# Patient Record
Sex: MALE | Race: WHITE | NOT HISPANIC OR LATINO | Employment: OTHER | URBAN - NONMETROPOLITAN AREA
[De-identification: names, ages, dates, MRNs, and addresses within clinical notes are randomized per-mention and may not be internally consistent; named-entity substitution may affect disease eponyms.]

---

## 2021-05-23 ENCOUNTER — HOSPITAL ENCOUNTER (EMERGENCY)
Facility: HOSPITAL | Age: 61
Discharge: ANOTHER HOSPITAL | End: 2021-05-23
Attending: FAMILY MEDICINE | Admitting: FAMILY MEDICINE

## 2021-05-23 ENCOUNTER — APPOINTMENT (OUTPATIENT)
Dept: CT IMAGING | Facility: HOSPITAL | Age: 61
End: 2021-05-23

## 2021-05-23 ENCOUNTER — APPOINTMENT (OUTPATIENT)
Dept: GENERAL RADIOLOGY | Facility: HOSPITAL | Age: 61
End: 2021-05-23

## 2021-05-23 VITALS
HEART RATE: 96 BPM | DIASTOLIC BLOOD PRESSURE: 49 MMHG | WEIGHT: 140 LBS | RESPIRATION RATE: 14 BRPM | HEIGHT: 68 IN | TEMPERATURE: 98 F | BODY MASS INDEX: 21.22 KG/M2 | SYSTOLIC BLOOD PRESSURE: 140 MMHG | OXYGEN SATURATION: 97 %

## 2021-05-23 DIAGNOSIS — S32.82XA: ICD-10-CM

## 2021-05-23 DIAGNOSIS — S32.9XXA CLOSED DISPLACED FRACTURE OF PELVIS, UNSPECIFIED PART OF PELVIS, INITIAL ENCOUNTER (HCC): Primary | ICD-10-CM
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
## 2021-05-23 LAB
6-ACETYL MORPHINE: NEGATIVE
ABO GROUP BLD: NORMAL
ABO GROUP BLD: NORMAL
ALBUMIN SERPL-MCNC: 3.88 G/DL (ref 3.5–5.2)
ALBUMIN/GLOB SERPL: 1.5 G/DL
ALP SERPL-CCNC: 105 U/L (ref 39–117)
ALT SERPL W P-5'-P-CCNC: 24 U/L (ref 1–41)
AMPHET+METHAMPHET UR QL: POSITIVE
ANION GAP SERPL CALCULATED.3IONS-SCNC: 12.9 MMOL/L (ref 5–15)
AST SERPL-CCNC: 87 U/L (ref 1–40)
BARBITURATES UR QL SCN: NEGATIVE
BASOPHILS # BLD AUTO: 0.03 10*3/MM3 (ref 0–0.2)
BASOPHILS NFR BLD AUTO: 0.5 % (ref 0–1.5)
BENZODIAZ UR QL SCN: NEGATIVE
BILIRUB SERPL-MCNC: 1.4 MG/DL (ref 0–1.2)
BLD GP AB SCN SERPL QL: NEGATIVE
BUN SERPL-MCNC: 21 MG/DL (ref 8–23)
BUN/CREAT SERPL: 22.1 (ref 7–25)
BUPRENORPHINE SERPL-MCNC: NEGATIVE NG/ML
CALCIUM SPEC-SCNC: 8.7 MG/DL (ref 8.6–10.5)
CANNABINOIDS SERPL QL: POSITIVE
CHLORIDE SERPL-SCNC: 97 MMOL/L (ref 98–107)
CO2 SERPL-SCNC: 19.1 MMOL/L (ref 22–29)
COCAINE UR QL: NEGATIVE
CREAT SERPL-MCNC: 0.95 MG/DL (ref 0.76–1.27)
DEPRECATED RDW RBC AUTO: 43.1 FL (ref 37–54)
EOSINOPHIL # BLD AUTO: 0.01 10*3/MM3 (ref 0–0.4)
EOSINOPHIL NFR BLD AUTO: 0.2 % (ref 0.3–6.2)
ERYTHROCYTE [DISTWIDTH] IN BLOOD BY AUTOMATED COUNT: 13.2 % (ref 12.3–15.4)
ETHANOL BLD-MCNC: <10 MG/DL (ref 0–10)
ETHANOL UR QL: <0.01 %
GFR SERPL CREATININE-BSD FRML MDRD: 81 ML/MIN/1.73
GLOBULIN UR ELPH-MCNC: 2.5 GM/DL
GLUCOSE SERPL-MCNC: 172 MG/DL (ref 65–99)
HCT VFR BLD AUTO: 32.3 % (ref 37.5–51)
HGB BLD-MCNC: 11.3 G/DL (ref 13–17.7)
IMM GRANULOCYTES # BLD AUTO: 0.02 10*3/MM3 (ref 0–0.05)
IMM GRANULOCYTES NFR BLD AUTO: 0.3 % (ref 0–0.5)
LYMPHOCYTES # BLD AUTO: 1.34 10*3/MM3 (ref 0.7–3.1)
LYMPHOCYTES NFR BLD AUTO: 20.8 % (ref 19.6–45.3)
MCH RBC QN AUTO: 30.9 PG (ref 26.6–33)
MCHC RBC AUTO-ENTMCNC: 35 G/DL (ref 31.5–35.7)
MCV RBC AUTO: 88.3 FL (ref 79–97)
METHADONE UR QL SCN: NEGATIVE
MONOCYTES # BLD AUTO: 0.92 10*3/MM3 (ref 0.1–0.9)
MONOCYTES NFR BLD AUTO: 14.3 % (ref 5–12)
NEUTROPHILS NFR BLD AUTO: 4.11 10*3/MM3 (ref 1.7–7)
NEUTROPHILS NFR BLD AUTO: 63.9 % (ref 42.7–76)
NRBC BLD AUTO-RTO: 0 /100 WBC (ref 0–0.2)
OPIATES UR QL: NEGATIVE
OXYCODONE UR QL SCN: NEGATIVE
PCP UR QL SCN: NEGATIVE
PLATELET # BLD AUTO: 247 10*3/MM3 (ref 140–450)
PMV BLD AUTO: 10 FL (ref 6–12)
POTASSIUM SERPL-SCNC: 4.5 MMOL/L (ref 3.5–5.2)
PROT SERPL-MCNC: 6.4 G/DL (ref 6–8.5)
RBC # BLD AUTO: 3.66 10*6/MM3 (ref 4.14–5.8)
RH BLD: POSITIVE
RH BLD: POSITIVE
SODIUM SERPL-SCNC: 129 MMOL/L (ref 136–145)
T&S EXPIRATION DATE: NORMAL
WBC # BLD AUTO: 6.43 10*3/MM3 (ref 3.4–10.8)

## 2021-05-23 PROCEDURE — 86901 BLOOD TYPING SEROLOGIC RH(D): CPT | Performed by: FAMILY MEDICINE

## 2021-05-23 PROCEDURE — 99285 EMERGENCY DEPT VISIT HI MDM: CPT

## 2021-05-23 PROCEDURE — 74178 CT ABD&PLV WO CNTR FLWD CNTR: CPT | Performed by: RADIOLOGY

## 2021-05-23 PROCEDURE — 80307 DRUG TEST PRSMV CHEM ANLYZR: CPT | Performed by: FAMILY MEDICINE

## 2021-05-23 PROCEDURE — 72125 CT NECK SPINE W/O DYE: CPT

## 2021-05-23 PROCEDURE — 74178 CT ABD&PLV WO CNTR FLWD CNTR: CPT

## 2021-05-23 PROCEDURE — 70450 CT HEAD/BRAIN W/O DYE: CPT

## 2021-05-23 PROCEDURE — 86850 RBC ANTIBODY SCREEN: CPT | Performed by: FAMILY MEDICINE

## 2021-05-23 PROCEDURE — 25010000002 HYDROMORPHONE 1 MG/ML SOLUTION: Performed by: FAMILY MEDICINE

## 2021-05-23 PROCEDURE — 96375 TX/PRO/DX INJ NEW DRUG ADDON: CPT

## 2021-05-23 PROCEDURE — 86900 BLOOD TYPING SEROLOGIC ABO: CPT | Performed by: FAMILY MEDICINE

## 2021-05-23 PROCEDURE — 86901 BLOOD TYPING SEROLOGIC RH(D): CPT

## 2021-05-23 PROCEDURE — 86900 BLOOD TYPING SEROLOGIC ABO: CPT

## 2021-05-23 PROCEDURE — 71275 CT ANGIOGRAPHY CHEST: CPT

## 2021-05-23 PROCEDURE — 73502 X-RAY EXAM HIP UNI 2-3 VIEWS: CPT

## 2021-05-23 PROCEDURE — 25010000002 HYDROMORPHONE PER 4 MG: Performed by: FAMILY MEDICINE

## 2021-05-23 PROCEDURE — 85025 COMPLETE CBC W/AUTO DIFF WBC: CPT | Performed by: FAMILY MEDICINE

## 2021-05-23 PROCEDURE — 72131 CT LUMBAR SPINE W/O DYE: CPT

## 2021-05-23 PROCEDURE — 96376 TX/PRO/DX INJ SAME DRUG ADON: CPT

## 2021-05-23 PROCEDURE — 96374 THER/PROPH/DIAG INJ IV PUSH: CPT

## 2021-05-23 PROCEDURE — 82077 ASSAY SPEC XCP UR&BREATH IA: CPT | Performed by: FAMILY MEDICINE

## 2021-05-23 PROCEDURE — 80053 COMPREHEN METABOLIC PANEL: CPT | Performed by: FAMILY MEDICINE

## 2021-05-23 PROCEDURE — 0 IOPAMIDOL PER 1 ML: Performed by: FAMILY MEDICINE

## 2021-05-23 PROCEDURE — 72128 CT CHEST SPINE W/O DYE: CPT

## 2021-05-23 RX ORDER — HYDROMORPHONE HYDROCHLORIDE 1 MG/ML
0.5 INJECTION, SOLUTION INTRAMUSCULAR; INTRAVENOUS; SUBCUTANEOUS ONCE
Status: COMPLETED | OUTPATIENT
Start: 2021-05-23 | End: 2021-05-23

## 2021-05-23 RX ORDER — SODIUM CHLORIDE 0.9 % (FLUSH) 0.9 %
10 SYRINGE (ML) INJECTION AS NEEDED
Status: DISCONTINUED | OUTPATIENT
Start: 2021-05-23 | End: 2021-05-23 | Stop reason: HOSPADM

## 2021-05-23 RX ADMIN — HYDROMORPHONE HYDROCHLORIDE 1 MG: 1 INJECTION, SOLUTION INTRAMUSCULAR; INTRAVENOUS; SUBCUTANEOUS at 11:21

## 2021-05-23 RX ADMIN — IOPAMIDOL 70 ML: 755 INJECTION, SOLUTION INTRAVENOUS at 10:19

## 2021-05-23 RX ADMIN — HYDROMORPHONE HYDROCHLORIDE 0.5 MG: 1 INJECTION, SOLUTION INTRAMUSCULAR; INTRAVENOUS; SUBCUTANEOUS at 09:40

## 2021-05-23 RX ADMIN — SODIUM CHLORIDE 1000 ML: 9 INJECTION, SOLUTION INTRAVENOUS at 09:41

## 2021-05-23 NOTE — ED NOTES
C-collar applied at time of arrival per Dr. Dinorah FITZPATRICK.      Yolanda Juárez, RN  05/23/21 0992
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
## 2021-08-31 ENCOUNTER — OFFICE VISIT (OUTPATIENT)
Dept: SURGERY | Facility: CLINIC | Age: 61
End: 2021-08-31

## 2021-08-31 VITALS
HEIGHT: 68 IN | SYSTOLIC BLOOD PRESSURE: 138 MMHG | HEART RATE: 64 BPM | DIASTOLIC BLOOD PRESSURE: 84 MMHG | BODY MASS INDEX: 17.43 KG/M2 | WEIGHT: 115 LBS

## 2021-08-31 DIAGNOSIS — K40.90 NON-RECURRENT UNILATERAL INGUINAL HERNIA WITHOUT OBSTRUCTION OR GANGRENE: Primary | ICD-10-CM

## 2021-08-31 PROCEDURE — 99243 OFF/OP CNSLTJ NEW/EST LOW 30: CPT | Performed by: SURGERY

## 2021-08-31 RX ORDER — CYANOCOBALAMIN (VITAMIN B-12) 1000 MCG
TABLET, EXTENDED RELEASE ORAL
Status: ON HOLD | COMMUNITY
Start: 2021-08-03 | End: 2022-09-08

## 2021-08-31 RX ORDER — ASPIRIN 81 MG/1
TABLET ORAL
Status: ON HOLD | COMMUNITY
Start: 2021-08-03 | End: 2022-09-08

## 2021-08-31 RX ORDER — GABAPENTIN 800 MG/1
800 TABLET ORAL 2 TIMES DAILY
COMMUNITY

## 2021-08-31 RX ORDER — HYDROCODONE BITARTRATE AND ACETAMINOPHEN 5; 325 MG/1; MG/1
TABLET ORAL
Status: ON HOLD | COMMUNITY
Start: 2021-08-13 | End: 2022-09-08

## 2021-09-02 PROBLEM — K40.90 NON-RECURRENT UNILATERAL INGUINAL HERNIA WITHOUT OBSTRUCTION OR GANGRENE: Status: ACTIVE | Noted: 2021-09-02
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
## 2021-11-02 ENCOUNTER — HOSPITAL ENCOUNTER (OUTPATIENT)
Dept: HOSPITAL 79 - OR | Age: 61
Discharge: HOME | End: 2021-11-02
Attending: SURGERY
Payer: COMMERCIAL

## 2021-11-02 DIAGNOSIS — K40.90: Primary | ICD-10-CM

## 2021-11-02 DIAGNOSIS — F17.200: ICD-10-CM

## 2021-11-02 DIAGNOSIS — E53.8: ICD-10-CM

## 2021-11-02 DIAGNOSIS — Z20.822: ICD-10-CM

## 2021-11-02 PROCEDURE — C1781 MESH (IMPLANTABLE): HCPCS

## 2022-07-27 ENCOUNTER — TRANSCRIBE ORDERS (OUTPATIENT)
Dept: ADMINISTRATIVE | Facility: HOSPITAL | Age: 62
End: 2022-07-27

## 2022-07-27 DIAGNOSIS — F17.210 CIGARETTE SMOKER: Primary | ICD-10-CM

## 2022-09-08 ENCOUNTER — HOSPITAL ENCOUNTER (INPATIENT)
Facility: HOSPITAL | Age: 62
LOS: 1 days | Discharge: HOME | End: 2022-09-09
Attending: STUDENT IN AN ORGANIZED HEALTH CARE EDUCATION/TRAINING PROGRAM | Admitting: INTERNAL MEDICINE

## 2022-09-08 ENCOUNTER — APPOINTMENT (OUTPATIENT)
Dept: CT IMAGING | Facility: HOSPITAL | Age: 62
End: 2022-09-08

## 2022-09-08 DIAGNOSIS — I63.9 INFARCTION OF LEFT BASAL GANGLIA: Primary | ICD-10-CM

## 2022-09-08 LAB
ALBUMIN SERPL-MCNC: 4.4 G/DL (ref 3.5–5.2)
ALBUMIN/GLOB SERPL: 1.4 G/DL
ALP SERPL-CCNC: 114 U/L (ref 39–117)
ALT SERPL W P-5'-P-CCNC: 20 U/L (ref 1–41)
ANION GAP SERPL CALCULATED.3IONS-SCNC: 10.9 MMOL/L (ref 5–15)
AST SERPL-CCNC: 25 U/L (ref 1–40)
BASOPHILS # BLD AUTO: 0.11 10*3/MM3 (ref 0–0.2)
BASOPHILS NFR BLD AUTO: 1.4 % (ref 0–1.5)
BILIRUB SERPL-MCNC: 0.2 MG/DL (ref 0–1.2)
BILIRUB UR QL STRIP: NEGATIVE
BUN SERPL-MCNC: 15 MG/DL (ref 8–23)
BUN/CREAT SERPL: 18.8 (ref 7–25)
CALCIUM SPEC-SCNC: 10.3 MG/DL (ref 8.6–10.5)
CHLORIDE SERPL-SCNC: 100 MMOL/L (ref 98–107)
CLARITY UR: ABNORMAL
CO2 SERPL-SCNC: 25.1 MMOL/L (ref 22–29)
COLOR UR: YELLOW
CREAT SERPL-MCNC: 0.8 MG/DL (ref 0.76–1.27)
DEPRECATED RDW RBC AUTO: 46.4 FL (ref 37–54)
EGFRCR SERPLBLD CKD-EPI 2021: 100.7 ML/MIN/1.73
EOSINOPHIL # BLD AUTO: 0.24 10*3/MM3 (ref 0–0.4)
EOSINOPHIL NFR BLD AUTO: 3.1 % (ref 0.3–6.2)
ERYTHROCYTE [DISTWIDTH] IN BLOOD BY AUTOMATED COUNT: 13.7 % (ref 12.3–15.4)
FLUAV RNA RESP QL NAA+PROBE: NOT DETECTED
FLUBV RNA RESP QL NAA+PROBE: NOT DETECTED
GLOBULIN UR ELPH-MCNC: 3.2 GM/DL
GLUCOSE SERPL-MCNC: 92 MG/DL (ref 65–99)
GLUCOSE UR STRIP-MCNC: NEGATIVE MG/DL
HBA1C MFR BLD: 5.3 % (ref 4.8–5.6)
HCT VFR BLD AUTO: 44.9 % (ref 37.5–51)
HGB BLD-MCNC: 15.3 G/DL (ref 13–17.7)
HGB UR QL STRIP.AUTO: NEGATIVE
HOLD SPECIMEN: NORMAL
HOLD SPECIMEN: NORMAL
IMM GRANULOCYTES # BLD AUTO: 0.02 10*3/MM3 (ref 0–0.05)
IMM GRANULOCYTES NFR BLD AUTO: 0.3 % (ref 0–0.5)
KETONES UR QL STRIP: NEGATIVE
LEUKOCYTE ESTERASE UR QL STRIP.AUTO: NEGATIVE
LYMPHOCYTES # BLD AUTO: 2.53 10*3/MM3 (ref 0.7–3.1)
LYMPHOCYTES NFR BLD AUTO: 32.8 % (ref 19.6–45.3)
MCH RBC QN AUTO: 31.2 PG (ref 26.6–33)
MCHC RBC AUTO-ENTMCNC: 34.1 G/DL (ref 31.5–35.7)
MCV RBC AUTO: 91.4 FL (ref 79–97)
MONOCYTES # BLD AUTO: 0.53 10*3/MM3 (ref 0.1–0.9)
MONOCYTES NFR BLD AUTO: 6.9 % (ref 5–12)
NEUTROPHILS NFR BLD AUTO: 4.28 10*3/MM3 (ref 1.7–7)
NEUTROPHILS NFR BLD AUTO: 55.5 % (ref 42.7–76)
NITRITE UR QL STRIP: NEGATIVE
NRBC BLD AUTO-RTO: 0 /100 WBC (ref 0–0.2)
PH UR STRIP.AUTO: 6.5 [PH] (ref 5–8)
PLATELET # BLD AUTO: 276 10*3/MM3 (ref 140–450)
PMV BLD AUTO: 10.1 FL (ref 6–12)
POTASSIUM SERPL-SCNC: 4.3 MMOL/L (ref 3.5–5.2)
PROT SERPL-MCNC: 7.6 G/DL (ref 6–8.5)
PROT UR QL STRIP: NEGATIVE
RBC # BLD AUTO: 4.91 10*6/MM3 (ref 4.14–5.8)
SARS-COV-2 RNA RESP QL NAA+PROBE: NOT DETECTED
SODIUM SERPL-SCNC: 136 MMOL/L (ref 136–145)
SP GR UR STRIP: 1.01 (ref 1–1.03)
UROBILINOGEN UR QL STRIP: ABNORMAL
WBC NRBC COR # BLD: 7.71 10*3/MM3 (ref 3.4–10.8)
WHOLE BLOOD HOLD COAG: NORMAL
WHOLE BLOOD HOLD SPECIMEN: NORMAL

## 2022-09-08 PROCEDURE — 83036 HEMOGLOBIN GLYCOSYLATED A1C: CPT | Performed by: INTERNAL MEDICINE

## 2022-09-08 PROCEDURE — 93005 ELECTROCARDIOGRAM TRACING: CPT | Performed by: PHYSICIAN ASSISTANT

## 2022-09-08 PROCEDURE — 93010 ELECTROCARDIOGRAM REPORT: CPT | Performed by: INTERNAL MEDICINE

## 2022-09-08 PROCEDURE — 0 IOPAMIDOL PER 1 ML: Performed by: STUDENT IN AN ORGANIZED HEALTH CARE EDUCATION/TRAINING PROGRAM

## 2022-09-08 PROCEDURE — 70450 CT HEAD/BRAIN W/O DYE: CPT | Performed by: RADIOLOGY

## 2022-09-08 PROCEDURE — 99285 EMERGENCY DEPT VISIT HI MDM: CPT

## 2022-09-08 PROCEDURE — 70450 CT HEAD/BRAIN W/O DYE: CPT

## 2022-09-08 PROCEDURE — 99223 1ST HOSP IP/OBS HIGH 75: CPT | Performed by: INTERNAL MEDICINE

## 2022-09-08 PROCEDURE — 87636 SARSCOV2 & INF A&B AMP PRB: CPT | Performed by: PHYSICIAN ASSISTANT

## 2022-09-08 PROCEDURE — 81003 URINALYSIS AUTO W/O SCOPE: CPT | Performed by: PHYSICIAN ASSISTANT

## 2022-09-08 PROCEDURE — 80053 COMPREHEN METABOLIC PANEL: CPT | Performed by: PHYSICIAN ASSISTANT

## 2022-09-08 PROCEDURE — 70496 CT ANGIOGRAPHY HEAD: CPT

## 2022-09-08 PROCEDURE — 85025 COMPLETE CBC W/AUTO DIFF WBC: CPT | Performed by: PHYSICIAN ASSISTANT

## 2022-09-08 PROCEDURE — 70498 CT ANGIOGRAPHY NECK: CPT

## 2022-09-08 RX ORDER — CLOPIDOGREL BISULFATE 75 MG/1
75 TABLET ORAL DAILY
Status: DISCONTINUED | OUTPATIENT
Start: 2022-09-09 | End: 2022-09-09 | Stop reason: HOSPADM

## 2022-09-08 RX ORDER — ASPIRIN 81 MG/1
81 TABLET ORAL DAILY
Status: DISCONTINUED | OUTPATIENT
Start: 2022-09-09 | End: 2022-09-09 | Stop reason: HOSPADM

## 2022-09-08 RX ORDER — SODIUM CHLORIDE 0.9 % (FLUSH) 0.9 %
10 SYRINGE (ML) INJECTION EVERY 12 HOURS SCHEDULED
Status: DISCONTINUED | OUTPATIENT
Start: 2022-09-08 | End: 2022-09-09 | Stop reason: HOSPADM

## 2022-09-08 RX ORDER — ALBUTEROL SULFATE 90 UG/1
2 AEROSOL, METERED RESPIRATORY (INHALATION) EVERY 4 HOURS PRN
COMMUNITY

## 2022-09-08 RX ORDER — CLOPIDOGREL BISULFATE 75 MG/1
300 TABLET ORAL ONCE
Status: COMPLETED | OUTPATIENT
Start: 2022-09-08 | End: 2022-09-08

## 2022-09-08 RX ORDER — HYDROCODONE BITARTRATE AND ACETAMINOPHEN 7.5; 325 MG/1; MG/1
1 TABLET ORAL EVERY 8 HOURS PRN
COMMUNITY

## 2022-09-08 RX ORDER — SODIUM CHLORIDE 0.9 % (FLUSH) 0.9 %
10 SYRINGE (ML) INJECTION AS NEEDED
Status: DISCONTINUED | OUTPATIENT
Start: 2022-09-08 | End: 2022-09-09 | Stop reason: HOSPADM

## 2022-09-08 RX ORDER — GABAPENTIN 400 MG/1
800 CAPSULE ORAL 3 TIMES DAILY
Status: DISCONTINUED | OUTPATIENT
Start: 2022-09-08 | End: 2022-09-08

## 2022-09-08 RX ORDER — ATORVASTATIN CALCIUM 40 MG/1
80 TABLET, FILM COATED ORAL NIGHTLY
Status: DISCONTINUED | OUTPATIENT
Start: 2022-09-08 | End: 2022-09-09 | Stop reason: HOSPADM

## 2022-09-08 RX ORDER — HYDROCODONE BITARTRATE AND ACETAMINOPHEN 5; 325 MG/1; MG/1
1 TABLET ORAL ONCE
Status: COMPLETED | OUTPATIENT
Start: 2022-09-08 | End: 2022-09-08

## 2022-09-08 RX ORDER — LANOLIN ALCOHOL/MO/W.PET/CERES
1000 CREAM (GRAM) TOPICAL DAILY
COMMUNITY

## 2022-09-08 RX ORDER — GABAPENTIN 400 MG/1
800 CAPSULE ORAL ONCE
Status: COMPLETED | OUTPATIENT
Start: 2022-09-08 | End: 2022-09-08

## 2022-09-08 RX ORDER — ASPIRIN 81 MG/1
81 TABLET, CHEWABLE ORAL ONCE
Status: COMPLETED | OUTPATIENT
Start: 2022-09-08 | End: 2022-09-08

## 2022-09-08 RX ADMIN — IOPAMIDOL 90 ML: 755 INJECTION, SOLUTION INTRAVENOUS at 19:10

## 2022-09-08 RX ADMIN — GABAPENTIN 800 MG: 400 CAPSULE ORAL at 19:24

## 2022-09-08 RX ADMIN — CLOPIDOGREL 300 MG: 75 TABLET, FILM COATED ORAL at 18:07

## 2022-09-08 RX ADMIN — ASPIRIN 81 MG: 81 TABLET, CHEWABLE ORAL at 18:07

## 2022-09-08 RX ADMIN — Medication 10 ML: at 21:55

## 2022-09-08 RX ADMIN — ATORVASTATIN CALCIUM 80 MG: 40 TABLET, FILM COATED ORAL at 21:54

## 2022-09-08 RX ADMIN — HYDROCODONE BITARTRATE AND ACETAMINOPHEN 1 TABLET: 5; 325 TABLET ORAL at 19:20

## 2022-09-08 NOTE — CONSULTS
Teleneurology Consultation Note    Date of Consultation: 9/8/2022  Requesting Attending: Karthikeyan Ramesh DO  Location: ED or Floor   Date/Time Telestroke doctor on video: 9/8/2022 17:40 EDT  Chief Complaint/Reason for consultation: right sided weakness     History of Present Illness:   Fritz Andujar is a 61 y.o. man with a history of current tobacco use presenting with right sided weakness.     Mr. Andujar presents with his brother in law who helps provide some history.    Mr. Andujar states four days ago he had sudden onset right sided weakness after a fall while helping a friend with some work. He thought the weakness was due to the fall so he stayed at his friends home to get some rest. In the morning and up until today the weakness has persisted. He reports he also experienced some blurred vision and headache since that time.     He is currently on ASA but unsure of the indication. He is a current every day smoker 1 ppd. He denies any history of diabetes or hypertension.        Review of System: All 12 points of review of system has been obtained and pertinent positive mentioned in HPI.   Medical History: Pertinent past medical history, surgical history, family history and social history has been reviewed.   Allergies:   No Known Allergies    Physical Exam:     NIHSS 2 - dysarthria and right arm drift   Mr. Andujar is seen sitting up in bed   His brother-in-law is at bedside   Alert and oriented to exact date and year 2022   Comprehension, naming and repetition are intact  Mild-moderate dysarthria    Extraocular eye movements in tact without nystagmus   No visual field cut. Able to count fingers in both eyes   Face symmetric   Mild right pronator drift   No drift in bilateral lower extremities   Sensation intact to light touch bilaterally  Gait is strolling        Personal review of CNS imaging (Official report by radiology pending)  CT with subacute left basal ganglia infarct     Lab Results:  No results found for: HGBA1C,  LDL  No results found for: NILLTSLD92, TSH    Assessment and Plan:  ##Left basal ganglia ischemic stroke  ##Right sided weakness   #Concern for Acute Ischemic Stroke   Recommend for patients with minor ischemic stroke or high risk TIA, a short course of Dual Antiplatelet Therapy  - [ ] Aspirin 81mg indefinitely  - [ ] Plavix load of 300mg, then 75mg daily for 21 days (end date September 29th, 2022)  - [ ] Atorvastatin 80mg    - [ ] CT angiogram of the head and neck   - [ ] MRI brain with and without contrast   - [ ] Telemetry   - [ ] Echocardiogram with bubble   - [ ] Stroke labs - lipids, hgbA1c  - [ ] 28 day holter monitor at discharge   - [ ] SLP, PT and OT evaluation   - smoking cessation     We will see patient on rounds in AM       Irina Barragan MD  We will continue to follow   If you have any questions about the patient recommendations, please call [phone] at anytime and ask to speak with the neurologist on call.   Press 1 for an emergent consult and 2 for a non-emergent consult.        Teleneurology Patient Verification & Consent  I have proceeded with this evaluation at the direct request of the referring practitioner as there is felt to be no appropriate specialist available at bedside to perform these evaluations. The [facility] ([facility]) practitioner has reported to me this is the correct patient and has obtained verbal consent from the patient/surrogate to perform his voluntary telemedicine evaluation (including obtaining history, performing examination and reviewing data provided by the patient and/or originating [facility] care provider). I attest that I introduced myself to the patient, provided my credentials, disclosed my location, and determined that, based on review of the patient's chart and discussion with the patient's primary team, telemedicine via a HIPAA-compliant, real-time, face-to-face, two-way, interactive audio and video platform is an appropriate and  effective means of providing this service.  The patient/surrogate has the right to refuse this evaluation as they have been explained risks (including potential loss of confidentiality), benefits, alternatives, and the potential need for subsequent face to face care. Patient/surrogate understands that there is a risk of medical inaccuracies given that our recommendations will be made based on reported data (and we must therefore assume this information is accurate). Knowing that there is a risk that this information is not reported accurately, and that the telemedicine video, audio, or data feed may be incomplete, the patient agrees to proceed with evaluation and holds us harmless knowing these risks. In this evaluation, we will be providing recommendations only. The ultimate decision to follow, or not follow, these recommendations will be left to the bedside treating/requesting practitioner. The patient/surrogate has been notified that other healthcare professionals (including technical personnel) may be involved in this audio-video evaluation. All laws concerning confidentiality and patient access to medical records and copies of medical records apply to telemedicine. The patient/surrogate has received the originating site's Health Notice of Privacy Practices.    Medical Data Reviewed:   1. Data reviewed include clinical labs, radiology, medical test;  2. Obtaining/reviewing old medical records;  3. Obtaining case history from another source;  4. Independent review of CNS images    IIrina MD, saw patient on 9/8/2022 for an initial in-patient or emergency room telemedicine face-to-face consult using interactive technology.

## 2022-09-08 NOTE — H&P
[facility] HOSPITALIST HISTORY AND PHYSICAL    Patient Identification:  Name:  Fritz Andujar  Age:  61 y.o.  Sex:  male  :  1960  MRN:  [id number]   Admit Date: 2022   Visit Number:  [id number]  Primary Care Physician:  Val Krishna APRN       Chief complaint: Right sided weakness    History of presenting illness: Mr. Andujar is a 61 y.o. male who presented to [facility] Emergency Department on 2022 with a chief complaint of right-sided weakness.  Patient does not go to a physician routinely and as such does not know of any medical history that he has.  Patient states 3 days ago he was helping a friend move furniture into a home when he had sudden onset right-sided weakness with slurred speech.  Patient states he sat down for approximately 15 minutes and his symptoms did somewhat improve.  He went home where he stayed for the next 3 days until family members convinced him to come to the emergency department for further evaluation.  Patient states his slurred speech has slightly improved and his right upper extremity weakness has slightly improved but is still persistent.  Initial evaluation emergency department did consist of basic laboratory work as well as physical exam and vital signs.  Initial vital signs found patient's blood pressure 119/75, respirations 18, heart rate 98, temperature 98 °F and oxygen saturation 98% on room air.  Initial lab work did include CBC and CMP.  CBC was within normal limits.  CMP was within normal limits.  CT head without contrast demonstrated focal possibly late acute appearing left basal ganglionic infarct.  As such, code stroke was called and teleneurology services did evaluate the patient.  Recommendation was made to admit patient for further evaluation with transthoracic echocardiogram with bubble study, MRI brain, CTA of head and neck as well as initiation of aspirin, Plavix and atorvastatin.  Patient will be admitted for this  work-up.  -------------------------------------------------------------------------------------------------------------------  No past medical history on file.  No past surgical history on file.  No family history on file.  Social History     Socioeconomic History   • Marital status: Single   Tobacco Use   • Smoking status: Current Every Day Smoker     Packs/day: 1.00     Types: Cigarettes   • Smokeless tobacco: Never Used   Vaping Use   • Vaping Use: Never used   Substance and Sexual Activity   • Alcohol use: Yes     Alcohol/week: 3.0 standard drinks     Types: 3 Cans of beer per week     Comment: weekly   • Drug use: Never     ---------------------------------------------------------------------------------------------------------------------   Allergies:  Patient has no known allergies.  ---------------------------------------------------------------------------------------------------------------------   Prior to Admission Medications     Prescriptions Last Dose Informant Patient Reported? Taking?    Aspirin Adult Low Strength 81 MG EC tablet   Yes No    Cyanocobalamin (Vitamin B-12 ER) 1000 MCG tablet controlled-release   Yes No    gabapentin (NEURONTIN) 800 MG tablet   Yes No    Take 800 mg by mouth 3 (Three) Times a Day.    HYDROcodone-acetaminophen (NORCO) 5-325 MG per tablet   Yes No        Hospital Scheduled Meds:        ---------------------------------------------------------------------------------------------------------------------   Review of Systems   On review of systems the patient denies the following unless noted above:     Constitutional:  Fevers, chills, weight change, fatigue     Eyes: Vision changes, headache, double vision, loss of vision     ENT: Runny nose, nose bleeds, ringing in ears, pain with swallowing, sore throat     Cardiovascular: Chest pains, palpitations, PND, orthopnea     Respiratory: Cough, wheezing, SOA, hemoptysis     GI:  Abdominal pain, diarrhea, constipation, change in  stool caliber,    Rectal bleeding, vomiting or nausea     : Difficulty voiding, dysuria, hematuria     Musculoskeletal: Changes of any chronic joint pain, swelling     Skin: Rash, itching, easy bruisability     Neurological: Unilateral weakness, new onset numbness, speech difficulties     Psychiatric: Sadness, tearfulness, feelings of hopelessness, racing thoughts     Endocrine:  Heat or cold intolerance, mood swings, polydipsia, polyphagia,    recent hypoglycemia  --------------------------------------------------------------------------------------------------------------------  Vital Signs:  Temp:  [98 °F (36.7 °C)] 98 °F (36.7 °C)  Heart Rate:  [56-98] 62  Resp:  [16-18] 18  BP: (119-152)/(75-95) 147/80      09/08/22  1254   Weight: 62.6 kg (138 lb)     Body mass index is 20.38 kg/m².  ---------------------------------------------------------------------------------------------------------------------   Physical exam:  Constitutional: Thin appearing  male in no apparent distress.     HENT:  Head:  Normocephalic and atraumatic.  Mouth:  Moist mucous membranes.    Eyes:  Conjunctivae and EOM are normal.  Pupils are equal, round, and reactive to light.  No scleral icterus.    Neck:  Neck supple. No thyromegaly.  No JVD present.    Cardiovascular:  Regular rate and rhythm with no murmurs, rubs, clicks or gallops appreciated.  Pulmonary/Chest:  Clear to auscultation bilaterally with no crackles, wheezes or rhonchi appreciated.  Abdominal:  Soft. Nontender. Nondistended  Bowel sounds are normal in all four quadrants. No organomegally appreciated.   Musculoskeletal:  5/5 muscle strength left upper and lower extremity.  4+/5 muscle strength right upper and lower extremity. No edema, no tenderness, and no deformity.  No red or swollen joints anywhere.    Neurological:  Alert, Cranial nerves II-XII intact with no focal defecits.  No facial droop.  No slurred speech.   Patient with mild dysarthria  Skin:  Warm  and dry to palpation with no rashes or lesions appreciated.  Peripheral vascular:  2+ radial and pedal pulses in bilateral upper and lower extremities.  Psychiatric:  Alert and oriented x3, demonstrates appropriate judgement and insight.  -------------------------------------------------------------------------------  ---------------------------------------------------------------------------------------------------------------------   Results from last 7 days   Lab Units 09/08/22  1521   WBC 10*3/mm3 7.71   HEMOGLOBIN g/dL 15.3   HEMATOCRIT % 44.9   MCV fL 91.4   MCHC g/dL 34.1   PLATELETS 10*3/mm3 276         Results from last 7 days   Lab Units 09/08/22  1521   SODIUM mmol/L 136   POTASSIUM mmol/L 4.3   CHLORIDE mmol/L 100   CO2 mmol/L 25.1   BUN mg/dL 15   CREATININE mg/dL 0.80   CALCIUM mg/dL 10.3   GLUCOSE mg/dL 92   ALBUMIN g/dL 4.40   BILIRUBIN mg/dL 0.2   ALK PHOS U/L 114   AST (SGOT) U/L 25   ALT (SGPT) U/L 20   Estimated Creatinine Clearance: 85.9 mL/min (by C-G formula based on SCr of 0.8 mg/dL).  No results found for: AMMONIA          No results found for: HGBA1C  No results found for: TSH, FREET4  No results found for: PREGTESTUR, PREGSERUM, HCG, HCGQUANT  Pain Management Panel     Pain Management Panel Latest Ref Rng & Units 5/23/2021    AMPHETAMINES SCREEN, URINE Negative Positive(A)    BARBITURATES SCREEN Negative Negative    BENZODIAZEPINE SCREEN, URINE Negative Negative    BUPRENORPHINEUR Negative Negative    COCAINE SCREEN, URINE Negative Negative    METHADONE SCREEN, URINE Negative Negative        No results found for: BLOODCX  No results found for: URINECX  No results found for: WOUNDCX  No results found for: STOOLCX      ---------------------------------------------------------------------------------------------------------------------  Imaging Results (Last 7 Days)     Procedure Component Value Units Date/Time    CT Head Without Contrast [[id number]] Collected: 09/08/22 0876     Updated:  09/08/22 1425    Narrative:      EXAM:    CT Head Without Intravenous Contrast     EXAM DATE:    9/8/2022 1:21 PM     CLINICAL HISTORY:    right sided weakness     TECHNIQUE:    Axial computed tomography images of the head/brain without intravenous  contrast.  Sagittal and coronal reformatted images were created and  reviewed.  This CT exam was performed using one or more of the following  dose reduction techniques:  automated exposure control, adjustment of  the mA and/or kV according to patient size, and/or use of iterative  reconstruction technique.     COMPARISON:    05/23/2021     FINDINGS:    Brain:  Focal infarct of the left basal ganglia and appears to  localize to the patient came in. Given low-density appearance this may  represent late acute or early subacute etiology. Infarct is  approximately 12.4 mm.  No intracranial hemorrhage identified.  No  significant white matter disease.    Ventricles:  Unremarkable.  No ventriculomegaly.    Bones/joints:  Unremarkable.  No acute fracture.    Soft tissues:  Unremarkable.    Sinuses:  Unremarkable as visualized.  No acute sinusitis.    Mastoid air cells:  Unremarkable as visualized.  No mastoid effusion.       Impression:      1.  Focal, possibly late acute appearing left basal ganglionic infarct.  2.  No intracranial hemorrhage.     Findings communicated to the ER PASTEVE, at 2:22 PM on 09/08/2022.     This report was finalized on 9/8/2022 2:23 PM by Dr. Surinder Flores MD.             I have personally reviewed the radiology images and read the final radiology report.  ---------------------------------------------------------------------------------------------------------------------  Assessment and Plan:    1.  Late acute/subacute left ganglionic CVA -we will admit patient to telemetry unit and obtain transthoracic echocardiogram with bubble study, MRI brain, CT angiography head and neck and initiate aspirin, Plavix and statin therapy.  We will also  obtain hemoglobin A1c and fasting lipid panel in the morning.  Appreciate teleneurology recommendations.    2.  Tobacco abuse -patient reports smoking approximately 1 pack of cigarettes daily.  Patient was offered nicotine patch which she has politely refused.    Disposition likely discharge in the next 24 hours    Roberto Cruz,   09/08/22  18:24 EDT

## 2022-09-08 NOTE — ED PROVIDER NOTES
Subjective   PIT    This is a 61 year old male patient who presents to the ER with chief complaint of weakness. PMH significant for current smoker and chronic pain. He takes aspirin daily. 4 days ago, he started having weakness in the right side of his arm and leg as well as slurred speech. These symptoms have worsened since so he reports to ER for further evaluation. Denies chest pain, SOB.           Review of Systems   Constitutional: Negative for fever.   HENT: Negative.    Respiratory: Negative.    Cardiovascular: Negative.  Negative for chest pain.   Gastrointestinal: Negative.  Negative for abdominal pain.   Endocrine: Negative.    Genitourinary: Negative.  Negative for dysuria.   Skin: Negative.    Neurological: Positive for speech difficulty and weakness. Negative for dizziness, tremors, seizures, syncope, facial asymmetry, light-headedness, numbness and headaches.   Psychiatric/Behavioral: Negative.    All other systems reviewed and are negative.      No past medical history on file.    No Known Allergies    No past surgical history on file.    No family history on file.    Social History     Socioeconomic History   • Marital status: Single   Tobacco Use   • Smoking status: Current Every Day Smoker     Packs/day: 1.00     Types: Cigarettes   • Smokeless tobacco: Never Used   Vaping Use   • Vaping Use: Never used   Substance and Sexual Activity   • Alcohol use: Yes     Alcohol/week: 3.0 standard drinks     Types: 3 Cans of beer per week     Comment: weekly   • Drug use: Never           Objective   Physical Exam  Vitals and nursing note reviewed.   Constitutional:       General: He is not in acute distress.     Appearance: He is well-developed. He is not diaphoretic.   HENT:      Head: Normocephalic and atraumatic.      Right Ear: External ear normal.      Left Ear: External ear normal.      Nose: Nose normal.   Eyes:      Conjunctiva/sclera: Conjunctivae normal.      Pupils: Pupils are equal, round, and  reactive to light.   Neck:      Vascular: No JVD.      Trachea: No tracheal deviation.   Cardiovascular:      Rate and Rhythm: Normal rate and regular rhythm.      Heart sounds: Normal heart sounds. No murmur heard.  Pulmonary:      Effort: Pulmonary effort is normal. No respiratory distress.      Breath sounds: Normal breath sounds. No wheezing.   Abdominal:      General: Bowel sounds are normal.      Palpations: Abdomen is soft.      Tenderness: There is no abdominal tenderness.   Musculoskeletal:         General: No deformity. Normal range of motion.      Cervical back: Normal range of motion and neck supple.   Skin:     General: Skin is warm and dry.      Coloration: Skin is not pale.      Findings: No erythema or rash.   Neurological:      Mental Status: He is alert and oriented to person, place, and time.      Cranial Nerves: No cranial nerve deficit.      Motor: Weakness present.      Comments: Facial asymmetry and Slurred speech noted. Normal EOMs and pupillary reflexes bilaterally. 3/5 plantar flexion and  strength on the right side. 5/5  and plantar flexion strength on the left side.    Psychiatric:         Behavior: Behavior normal.         Thought Content: Thought content normal.         Procedures           ED Course  ED Course as of 09/08/22 1759   Thu Sep 08, 2022   1437 CT Head Without Contrast  IMPRESSION:  1.  Focal, possibly late acute appearing left basal ganglionic infarct.  2.  No intracranial hemorrhage.     Findings communicated to the ER STEVE BRINK, at 2:22 PM on 09/08/2022.     This report was finalized on 9/8/2022 2:23 PM by Dr. Surinder Flores MD. [MM]   1442 NIH stroke scale is 3 [MM]   1452 Sinus bradycardia with heart rate 58, , QRS 84   Specific ST repolarization abnormalities in V2, V3, V4 [LK]   1728 Spoke with Dr. Barragan neuro at [facility], who has said to admit the patient to our hospitalist and asked me to do 300 mg plavix today and 75 mg plavix daily after  that as well as his daily aspirin 81 mg. She has also asked me to get a CTA of head and neck now and an MRI in the next 24 hours as well as an echo.  [MM]   3397 Spoke with Dr. Cruz who has accepted him in admission.  [MM]      ED Course User Index  [LK] Jessi Borjas DO  [MM] Mercy Flores PA Dr King: Only documented EKG on this pt encounter. I had no involvement in the medical management of this patient.                                          MDM  Number of Diagnoses or Management Options     Amount and/or Complexity of Data Reviewed  Clinical lab tests: ordered and reviewed  Tests in the radiology section of CPT®: ordered and reviewed  Discuss the patient with other providers: yes        Final diagnoses:   Infarction of left basal ganglia (HCC)       ED Disposition  ED Disposition     ED Disposition   Decision to Admit    Condition   --    Comment   --             No follow-up provider specified.       Medication List      No changes were made to your prescriptions during this visit.          Mercy Flores PA  09/08/22 1972       Jessi Borjas DO  10/14/22 2234

## 2022-09-08 NOTE — ED NOTES
MEDICAL SCREENING:    Reason for Visit: Right-sided weakness with speech difficulty x4 days.  No change today.  Reviewed with Dr. Ramesh.  No code stroke alerted since he is 4 days out.    Patient initially seen in triage.  The patient was advised further evaluation and diagnostic testing will be needed, some of the treatment and testing will be initiated in the lobby in order to begin the process.  The patient will be returned to the waiting area for the time being and possibly be re-assessed by a subsequent ED provider.  The patient will be brought back to the treatment area in as timely manner as possible.         Aditya Newell PA  09/08/22 4936

## 2022-09-09 ENCOUNTER — APPOINTMENT (OUTPATIENT)
Dept: CARDIOLOGY | Facility: HOSPITAL | Age: 62
End: 2022-09-09

## 2022-09-09 ENCOUNTER — APPOINTMENT (OUTPATIENT)
Dept: MRI IMAGING | Facility: HOSPITAL | Age: 62
End: 2022-09-09

## 2022-09-09 ENCOUNTER — APPOINTMENT (OUTPATIENT)
Dept: RESPIRATORY THERAPY | Facility: HOSPITAL | Age: 62
End: 2022-09-09

## 2022-09-09 VITALS
TEMPERATURE: 97.8 F | DIASTOLIC BLOOD PRESSURE: 78 MMHG | RESPIRATION RATE: 18 BRPM | HEIGHT: 68 IN | BODY MASS INDEX: 17.23 KG/M2 | HEART RATE: 80 BPM | OXYGEN SATURATION: 98 % | SYSTOLIC BLOOD PRESSURE: 128 MMHG | WEIGHT: 113.7 LBS

## 2022-09-09 PROBLEM — I63.9 INFARCTION OF LEFT BASAL GANGLIA (HCC): Status: RESOLVED | Noted: 2022-09-08 | Resolved: 2022-09-09

## 2022-09-09 PROBLEM — E43 SEVERE MALNUTRITION: Status: ACTIVE | Noted: 2022-09-09

## 2022-09-09 LAB
BH CV ECHO MEAS - AO MAX PG: 4.8 MMHG
BH CV ECHO MEAS - AO MEAN PG: 3 MMHG
BH CV ECHO MEAS - AO ROOT DIAM: 2.7 CM
BH CV ECHO MEAS - AO V2 MAX: 110 CM/SEC
BH CV ECHO MEAS - AO V2 VTI: 26.2 CM
BH CV ECHO MEAS - EDV(CUBED): 70.4 ML
BH CV ECHO MEAS - EDV(MOD-SP4): 40.3 ML
BH CV ECHO MEAS - EF(MOD-SP4): 56.1 %
BH CV ECHO MEAS - ESV(CUBED): 33.1 ML
BH CV ECHO MEAS - ESV(MOD-SP4): 17.7 ML
BH CV ECHO MEAS - FS: 22.3 %
BH CV ECHO MEAS - IVS/LVPW: 0.93 CM
BH CV ECHO MEAS - IVSD: 1.06 CM
BH CV ECHO MEAS - LAT PEAK E' VEL: 13.1 CM/SEC
BH CV ECHO MEAS - LV DIASTOLIC VOL/BSA (35-75): 25.1 CM2
BH CV ECHO MEAS - LV MASS(C)D: 153 GRAMS
BH CV ECHO MEAS - LV SYSTOLIC VOL/BSA (12-30): 11 CM2
BH CV ECHO MEAS - LVIDD: 4.1 CM
BH CV ECHO MEAS - LVIDS: 3.2 CM
BH CV ECHO MEAS - LVOT AREA: 2.8 CM2
BH CV ECHO MEAS - LVOT DIAM: 1.9 CM
BH CV ECHO MEAS - LVPWD: 1.14 CM
BH CV ECHO MEAS - MED PEAK E' VEL: 5.9 CM/SEC
BH CV ECHO MEAS - MV A MAX VEL: 77.7 CM/SEC
BH CV ECHO MEAS - MV E MAX VEL: 69.5 CM/SEC
BH CV ECHO MEAS - MV E/A: 0.89
BH CV ECHO MEAS - PA ACC TIME: 0.12 SEC
BH CV ECHO MEAS - PA PR(ACCEL): 25 MMHG
BH CV ECHO MEAS - SI(MOD-SP4): 14.1 ML/M2
BH CV ECHO MEAS - SV(MOD-SP4): 22.6 ML
BH CV ECHO MEAS - TAPSE (>1.6): 3.1 CM
BH CV ECHO MEASUREMENTS AVERAGE E/E' RATIO: 7.32
CHOLEST SERPL-MCNC: 181 MG/DL (ref 0–200)
HDLC SERPL-MCNC: 44 MG/DL (ref 40–60)
LDLC SERPL CALC-MCNC: 90 MG/DL (ref 0–100)
LDLC/HDLC SERPL: 1.83 {RATIO}
LEFT ATRIUM VOLUME INDEX: 11.6 ML/M2
MAXIMAL PREDICTED HEART RATE: 159 BPM
STRESS TARGET HR: 135 BPM
TRIGL SERPL-MCNC: 282 MG/DL (ref 0–150)
TROPONIN T SERPL-MCNC: <0.01 NG/ML (ref 0–0.03)
VLDLC SERPL-MCNC: 47 MG/DL (ref 5–40)

## 2022-09-09 PROCEDURE — 93010 ELECTROCARDIOGRAM REPORT: CPT | Performed by: INTERNAL MEDICINE

## 2022-09-09 PROCEDURE — 92523 SPEECH SOUND LANG COMPREHEN: CPT

## 2022-09-09 PROCEDURE — 0 GADOBENATE DIMEGLUMINE 529 MG/ML SOLUTION: Performed by: INTERNAL MEDICINE

## 2022-09-09 PROCEDURE — 99239 HOSP IP/OBS DSCHRG MGMT >30: CPT | Performed by: INTERNAL MEDICINE

## 2022-09-09 PROCEDURE — 80061 LIPID PANEL: CPT | Performed by: INTERNAL MEDICINE

## 2022-09-09 PROCEDURE — 99232 SBSQ HOSP IP/OBS MODERATE 35: CPT | Performed by: NURSE PRACTITIONER

## 2022-09-09 PROCEDURE — 70553 MRI BRAIN STEM W/O & W/DYE: CPT

## 2022-09-09 PROCEDURE — 70553 MRI BRAIN STEM W/O & W/DYE: CPT | Performed by: RADIOLOGY

## 2022-09-09 PROCEDURE — 92610 EVALUATE SWALLOWING FUNCTION: CPT

## 2022-09-09 PROCEDURE — 94799 UNLISTED PULMONARY SVC/PX: CPT

## 2022-09-09 PROCEDURE — 93306 TTE W/DOPPLER COMPLETE: CPT | Performed by: INTERNAL MEDICINE

## 2022-09-09 PROCEDURE — A9577 INJ MULTIHANCE: HCPCS | Performed by: INTERNAL MEDICINE

## 2022-09-09 PROCEDURE — 93306 TTE W/DOPPLER COMPLETE: CPT

## 2022-09-09 PROCEDURE — 84484 ASSAY OF TROPONIN QUANT: CPT | Performed by: INTERNAL MEDICINE

## 2022-09-09 PROCEDURE — 93005 ELECTROCARDIOGRAM TRACING: CPT | Performed by: INTERNAL MEDICINE

## 2022-09-09 PROCEDURE — 97161 PT EVAL LOW COMPLEX 20 MIN: CPT

## 2022-09-09 RX ORDER — ATORVASTATIN CALCIUM 80 MG/1
80 TABLET, FILM COATED ORAL NIGHTLY
Qty: 90 TABLET | Refills: 1 | Status: SHIPPED | OUTPATIENT
Start: 2022-09-09

## 2022-09-09 RX ORDER — GABAPENTIN 400 MG/1
800 CAPSULE ORAL EVERY 8 HOURS SCHEDULED
Status: CANCELLED | OUTPATIENT
Start: 2022-09-09

## 2022-09-09 RX ORDER — GABAPENTIN 400 MG/1
800 CAPSULE ORAL EVERY 12 HOURS SCHEDULED
Status: DISCONTINUED | OUTPATIENT
Start: 2022-09-09 | End: 2022-09-09 | Stop reason: HOSPADM

## 2022-09-09 RX ORDER — ASPIRIN 81 MG/1
81 TABLET ORAL DAILY
Qty: 30 TABLET | Refills: 1 | Status: SHIPPED | OUTPATIENT
Start: 2022-09-10

## 2022-09-09 RX ORDER — LANOLIN ALCOHOL/MO/W.PET/CERES
1000 CREAM (GRAM) TOPICAL DAILY
Status: DISCONTINUED | OUTPATIENT
Start: 2022-09-09 | End: 2022-09-09 | Stop reason: HOSPADM

## 2022-09-09 RX ORDER — CLOPIDOGREL BISULFATE 75 MG/1
75 TABLET ORAL DAILY
Qty: 30 TABLET | Refills: 2 | Status: SHIPPED | OUTPATIENT
Start: 2022-09-10 | End: 2022-12-09

## 2022-09-09 RX ORDER — ALBUTEROL SULFATE 2.5 MG/3ML
2.5 SOLUTION RESPIRATORY (INHALATION) EVERY 4 HOURS PRN
Status: DISCONTINUED | OUTPATIENT
Start: 2022-09-09 | End: 2022-09-09 | Stop reason: HOSPADM

## 2022-09-09 RX ORDER — HYDROCODONE BITARTRATE AND ACETAMINOPHEN 7.5; 325 MG/1; MG/1
1 TABLET ORAL EVERY 8 HOURS PRN
Status: DISCONTINUED | OUTPATIENT
Start: 2022-09-09 | End: 2022-09-09 | Stop reason: HOSPADM

## 2022-09-09 RX ADMIN — GADOBENATE DIMEGLUMINE 10 ML: 529 INJECTION, SOLUTION INTRAVENOUS at 09:50

## 2022-09-09 RX ADMIN — Medication 10 ML: at 10:09

## 2022-09-09 RX ADMIN — HYDROCODONE BITARTRATE AND ACETAMINOPHEN 1 TABLET: 7.5; 325 TABLET ORAL at 11:14

## 2022-09-09 RX ADMIN — ASPIRIN 81 MG: 81 TABLET, COATED ORAL at 09:31

## 2022-09-09 RX ADMIN — Medication 1000 MCG: at 13:00

## 2022-09-09 RX ADMIN — CLOPIDOGREL 75 MG: 75 TABLET, FILM COATED ORAL at 09:31

## 2022-09-09 RX ADMIN — GABAPENTIN 800 MG: 400 CAPSULE ORAL at 11:14

## 2022-09-09 NOTE — PLAN OF CARE
Goal Outcome Evaluation:  Pt resting in bed watching TV throughout shift. No requests/complaints. VSS, no s/s of acute distress noted. Will continue to monitor and follow plan of care.

## 2022-09-09 NOTE — DISCHARGE SUMMARY
[facility] HOSPITALIST MEDICINE DISCHARGE SUMMARY    Patient Identification:  Name:  Fritz Andujar  Age:  61 y.o.  Sex:  male  :  1960  MRN:  [id number]  Visit Number:  [id number]    Date of Admission: 2022  Date of Discharge: 2022    PCP: Val Krishna APRN    DISCHARGE DIAGNOSIS   1.  Late acute/subacute left ganglionic CVA  2.  Tobacco abuse      CONSULTS  1. Dr. Barragan, Teleneurology      PROCEDURES PERFORMED   None      HOSPITAL COURSE  Mr. Andujar is a 61 y.o. male who presented to [facility] ED 2022 with a chief complaint of right-sided weakness.  Patient has no pertinent past medical history that he is aware of.  Patient reported approximately 3 days prior to evaluation in the emergency department he was helping a friend move furniture into his home when he had sudden onset right-sided weakness with slurred speech.  Patient reports sitting down for approximately 15 minutes and his symptoms improved.  Patient went home where he stayed for the next 3 days until family members convinced him to come to the emergency department for further evaluation.  Patient reported continued slurred speech and right-sided weakness.  Initial evaluation the emergency department did consist of basic laboratory work as well as physical exam and vital signs.  Initial vital signs found patient's blood pressure 119/75, respirations 18, heart rate 98 and temperature 98 °F with oxygen saturation 98% on room air.  Initial lab work did include CBC and CMP.  Both of these were within normal limits.  CT head without contrast demonstrated a focal and possibly late acute appearing left basal ganglionic infarct.  As such, code stroke was called and teleneurology services did evaluate the patient.  After thorough evaluation, confirmation was made of a late acute/subacute CVA and recommendation was made to admit patient for further evaluation.    Per teleneurology recommendations, transthoracic  echocardiogram as well as MRI brain and CTA of head and neck were obtained.  CTA of head and neck demonstrated 50% stenosis of left internal carotid artery.  MRI demonstrates late acute ischemic CVA of left basal ganglionic region.  Transthoracic echocardiogram has yet to be read.  Patient was started on aspirin 81 mg p.o. daily, Plavix 75 mg p.o. daily and atorvastatin 80 mg p.o. nightly.  Patient is eager to return home.  As such, patient will be discharged from the hospital in stable condition today with anticipated follow-up with stroke clinic in 2 weeks.  Patient can have transthoracic echo reviewed with him at that time and if PFO is confirmed could discuss anticoagulation at that time.  This plan was discussed with the patient and he expresses understanding and willingness to proceed with the beforementioned plan.    VITAL SIGNS:      09/08/22  1254 09/08/22  1937 09/09/22  0519   Weight: 62.6 kg (138 lb) 51.6 kg (113 lb 11.2 oz) (previous weight was stated) 51.6 kg (113 lb 11.2 oz) (admit weight less than 24 hours)     Body mass index is 17.29 kg/m².    PHYSICAL EXAM:  Constitutional: Thin appearing  male in no apparent distress.     HENT:  Head:  Normocephalic and atraumatic.  Mouth:  Moist mucous membranes.    Eyes:  Conjunctivae and EOM are normal.  Pupils are equal, round, and reactive to light.  No scleral icterus.    Neck:  Neck supple. No thyromegaly.  No JVD present.    Cardiovascular:  Regular rate and rhythm with no murmurs, rubs, clicks or gallops appreciated.  Pulmonary/Chest:  Clear to auscultation bilaterally with no crackles, wheezes or rhonchi appreciated.  Abdominal:  Soft. Nontender. Nondistended  Bowel sounds are normal in all four quadrants. No organomegally appreciated.   Musculoskeletal:  5/5 muscle strength left upper and lower extremity.  4+/5 muscle strength right upper and lower extremity. No edema, no tenderness, and no deformity.  No red or swollen joints anywhere.     Neurological:  Alert, Cranial nerves II-XII intact with no focal defecits.  No facial droop.  No slurred speech.   Patient with mild dysarthria  Skin:  Warm and dry to palpation with no rashes or lesions appreciated.  Peripheral vascular:  2+ radial and pedal pulses in bilateral upper and lower extremities.  Psychiatric:  Alert and oriented x3, demonstrates appropriate judgement and insight.    DISCHARGE DISPOSITION   Stable    DISCHARGE MEDICATIONS:     Discharge Medications      New Medications      Instructions Start Date   aspirin 81 MG EC tablet   81 mg, Oral, Daily   Start Date: September 10, 2022     atorvastatin 80 MG tablet  Commonly known as: LIPITOR   80 mg, Oral, Nightly      clopidogrel 75 MG tablet  Commonly known as: PLAVIX   75 mg, Oral, Daily   Start Date: September 10, 2022        Continue These Medications      Instructions Start Date   albuterol sulfate  (90 Base) MCG/ACT inhaler  Commonly known as: PROVENTIL HFA;VENTOLIN HFA;PROAIR HFA   2 puffs, Inhalation, Every 4 Hours PRN      gabapentin 800 MG tablet  Commonly known as: NEURONTIN   800 mg, Oral, 2 Times Daily      HYDROcodone-acetaminophen 7.5-325 MG per tablet  Commonly known as: NORCO   1 tablet, Oral, Every 8 Hours PRN      vitamin B-12 1000 MCG tablet  Commonly known as: CYANOCOBALAMIN   1,000 mcg, Oral, Daily                   Additional Instructions for the Follow-ups that You Need to Schedule     Discharge Follow-up with Specialty: stroke clinic; 2 Weeks   As directed      Specialty: stroke clinic    Follow Up: 2 Weeks    Follow Up Details: follow up CVA            Follow-up Information     [facility] NEUROLOGY [place] Follow up.    Specialty: Neurology  Why: Follow-up in 4 to 6 weeks after discharge  Contact information:  [address] [place] [id number] [phone]           Erendira, Val Rivera, APRN .    Specialty: Nurse Practitioner  Contact information:  [address] [facility]  [address]  [phone]                         TEST  RESULTS PENDING AT DISCHARGE       The ASCVD Risk score ([place] antonio VELASQUEZ Jr., et al., 2013) failed to calculate for the following reasons:    The patient has a prior MI or stroke diagnosis     Roberto Cruz DO  09/09/22  16:05 EDT    Please note that this discharge summary required more than 30 minutes to complete.    Please send a copy of this dictation to the following providers:  Val Krishna APRN

## 2022-09-09 NOTE — PLAN OF CARE
Goal Outcome Evaluation:     Patient admitted to  this shift. Pt rested well this shift. No complaints of chest pain or shortness of breath. No complaints of pain this shift. Vital signs have remained stable. No indicators of acute distress noted.

## 2022-09-09 NOTE — PAYOR COMM NOTE
"[facility]  NPI:[id number]    Utilization Review  Contact: Augusta Galvin RN  Phone: [phone]  Fax:[phone]    INITIATE INPATIENT AUTHORIZATION    Anand Andujar (61 y.o. Male)             Date of Birth   1960    Social Security Number       Address   [address] [place] [address]    Home Phone   [phone]    MRN   [id number]       Judaism   Non-Baptist    Marital Status   Single                            Admission Date   22    Admission Type   Emergency    Admitting Provider   Roberto Cruz DO    Attending Provider   Roberto Cruz DO    Department, Room/Bed   [facility] 3 SOUTH, 3307/1S       Discharge Date       Discharge Disposition       Discharge Destination                               Attending Provider: Roberto Cruz DO    Allergies: No Known Allergies    Isolation: None   Infection: COVID (rule out) (22)   Code Status: CPR   Advance Care Planning Activity    Ht: 172.7 cm (68\")   Wt: 51.6 kg (113 lb 11.2 oz)    Admission Cmt: None   Principal Problem: None                Active Insurance as of 2022     Primary Coverage     Payor Plan Insurance Group Employer/Plan Group    [facility]      Payor Plan Address Payor Plan Phone Number Payor Plan Fax Number Effective Dates    [address]   3/23/2016 - None Entered    [facility] [id number]       Subscriber Name Subscriber Birth Date Member ID       ANAND ANDUJAR 1960 [id number]                 Emergency Contacts      (Rel.) Home Phone Work Phone Mobile Phone    KATY SEWELL (Brother) -- -- [phone]               History & Physical      Roberto Cruz DO at 22 1820              [facility] HOSPITALIST HISTORY AND PHYSICAL    Patient Identification:  Name:  Anand Andujar  Age:  61 y.o.  Sex:  male  :  1960  MRN:  [id number]   Admit Date: 2022   Visit Number:  " [id number]  Primary Care Physician:  Val Krishna, SADIA       Chief complaint: Right sided weakness    History of presenting illness: Mr. Andujar is a 61 y.o. male who presented to [facility] Emergency Department on 9/8/2022 with a chief complaint of right-sided weakness.  Patient does not go to a physician routinely and as such does not know of any medical history that he has.  Patient states 3 days ago he was helping a friend move furniture into a home when he had sudden onset right-sided weakness with slurred speech.  Patient states he sat down for approximately 15 minutes and his symptoms did somewhat improve.  He went home where he stayed for the next 3 days until family members convinced him to come to the emergency department for further evaluation.  Patient states his slurred speech has slightly improved and his right upper extremity weakness has slightly improved but is still persistent.  Initial evaluation emergency department did consist of basic laboratory work as well as physical exam and vital signs.  Initial vital signs found patient's blood pressure 119/75, respirations 18, heart rate 98, temperature 98 °F and oxygen saturation 98% on room air.  Initial lab work did include CBC and CMP.  CBC was within normal limits.  CMP was within normal limits.  CT head without contrast demonstrated focal possibly late acute appearing left basal ganglionic infarct.  As such, code stroke was called and teleneurology services did evaluate the patient.  Recommendation was made to admit patient for further evaluation with transthoracic echocardiogram with bubble study, MRI brain, CTA of head and neck as well as initiation of aspirin, Plavix and atorvastatin.  Patient will be admitted for this work-up.  -------------------------------------------------------------------------------------------------------------------  No past medical history on file.  No past surgical history on file.  No family history on  file.  Social History     Socioeconomic History   • Marital status: Single   Tobacco Use   • Smoking status: Current Every Day Smoker     Packs/day: 1.00     Types: Cigarettes   • Smokeless tobacco: Never Used   Vaping Use   • Vaping Use: Never used   Substance and Sexual Activity   • Alcohol use: Yes     Alcohol/week: 3.0 standard drinks     Types: 3 Cans of beer per week     Comment: weekly   • Drug use: Never     ---------------------------------------------------------------------------------------------------------------------   Allergies:  Patient has no known allergies.  ---------------------------------------------------------------------------------------------------------------------   Prior to Admission Medications     Prescriptions Last Dose Informant Patient Reported? Taking?    Aspirin Adult Low Strength 81 MG EC tablet   Yes No    Cyanocobalamin (Vitamin B-12 ER) 1000 MCG tablet controlled-release   Yes No    gabapentin (NEURONTIN) 800 MG tablet   Yes No    Take 800 mg by mouth 3 (Three) Times a Day.    HYDROcodone-acetaminophen (NORCO) 5-325 MG per tablet   Yes No        Hospital Scheduled Meds:        ---------------------------------------------------------------------------------------------------------------------   Review of Systems   On review of systems the patient denies the following unless noted above:     Constitutional:  Fevers, chills, weight change, fatigue     Eyes: Vision changes, headache, double vision, loss of vision     ENT: Runny nose, nose bleeds, ringing in ears, pain with swallowing, sore throat     Cardiovascular: Chest pains, palpitations, PND, orthopnea     Respiratory: Cough, wheezing, SOA, hemoptysis     GI:  Abdominal pain, diarrhea, constipation, change in stool caliber,    Rectal bleeding, vomiting or nausea     : Difficulty voiding, dysuria, hematuria     Musculoskeletal: Changes of any chronic joint pain, swelling     Skin: Rash, itching, easy  bruisability     Neurological: Unilateral weakness, new onset numbness, speech difficulties     Psychiatric: Sadness, tearfulness, feelings of hopelessness, racing thoughts     Endocrine:  Heat or cold intolerance, mood swings, polydipsia, polyphagia,    recent hypoglycemia  --------------------------------------------------------------------------------------------------------------------  Vital Signs:  Temp:  [98 °F (36.7 °C)] 98 °F (36.7 °C)  Heart Rate:  [56-98] 62  Resp:  [16-18] 18  BP: (119-152)/(75-95) 147/80      09/08/22  1254   Weight: 62.6 kg (138 lb)     Body mass index is 20.38 kg/m².  ---------------------------------------------------------------------------------------------------------------------   Physical exam:  Constitutional: Thin appearing  male in no apparent distress.     HENT:  Head:  Normocephalic and atraumatic.  Mouth:  Moist mucous membranes.    Eyes:  Conjunctivae and EOM are normal.  Pupils are equal, round, and reactive to light.  No scleral icterus.    Neck:  Neck supple. No thyromegaly.  No JVD present.    Cardiovascular:  Regular rate and rhythm with no murmurs, rubs, clicks or gallops appreciated.  Pulmonary/Chest:  Clear to auscultation bilaterally with no crackles, wheezes or rhonchi appreciated.  Abdominal:  Soft. Nontender. Nondistended  Bowel sounds are normal in all four quadrants. No organomegally appreciated.   Musculoskeletal:  5/5 muscle strength left upper and lower extremity.  4+/5 muscle strength right upper and lower extremity. No edema, no tenderness, and no deformity.  No red or swollen joints anywhere.    Neurological:  Alert, Cranial nerves II-XII intact with no focal defecits.  No facial droop.  No slurred speech.   Patient with mild dysarthria  Skin:  Warm and dry to palpation with no rashes or lesions appreciated.  Peripheral vascular:  2+ radial and pedal pulses in bilateral upper and lower extremities.  Psychiatric:  Alert and oriented x3,  demonstrates appropriate judgement and insight.  -------------------------------------------------------------------------------  ---------------------------------------------------------------------------------------------------------------------   Results from last 7 days   Lab Units 09/08/22  1521   WBC 10*3/mm3 7.71   HEMOGLOBIN g/dL 15.3   HEMATOCRIT % 44.9   MCV fL 91.4   MCHC g/dL 34.1   PLATELETS 10*3/mm3 276         Results from last 7 days   Lab Units 09/08/22  1521   SODIUM mmol/L 136   POTASSIUM mmol/L 4.3   CHLORIDE mmol/L 100   CO2 mmol/L 25.1   BUN mg/dL 15   CREATININE mg/dL 0.80   CALCIUM mg/dL 10.3   GLUCOSE mg/dL 92   ALBUMIN g/dL 4.40   BILIRUBIN mg/dL 0.2   ALK PHOS U/L 114   AST (SGOT) U/L 25   ALT (SGPT) U/L 20   Estimated Creatinine Clearance: 85.9 mL/min (by C-G formula based on SCr of 0.8 mg/dL).  No results found for: AMMONIA          No results found for: HGBA1C  No results found for: TSH, FREET4  No results found for: PREGTESTUR, PREGSERUM, HCG, HCGQUANT  Pain Management Panel     Pain Management Panel Latest Ref Rng & Units 5/23/2021    AMPHETAMINES SCREEN, URINE Negative Positive(A)    BARBITURATES SCREEN Negative Negative    BENZODIAZEPINE SCREEN, URINE Negative Negative    BUPRENORPHINEUR Negative Negative    COCAINE SCREEN, URINE Negative Negative    METHADONE SCREEN, URINE Negative Negative        No results found for: BLOODCX  No results found for: URINECX  No results found for: WOUNDCX  No results found for: STOOLCX      ---------------------------------------------------------------------------------------------------------------------  Imaging Results (Last 7 Days)     Procedure Component Value Units Date/Time    CT Head Without Contrast [[id number]] Collected: 09/08/22 1420     Updated: 09/08/22 1425    Narrative:      EXAM:    CT Head Without Intravenous Contrast     EXAM DATE:    9/8/2022 1:21 PM     CLINICAL HISTORY:    right sided weakness     TECHNIQUE:    Axial computed  tomography images of the head/brain without intravenous  contrast.  Sagittal and coronal reformatted images were created and  reviewed.  This CT exam was performed using one or more of the following  dose reduction techniques:  automated exposure control, adjustment of  the mA and/or kV according to patient size, and/or use of iterative  reconstruction technique.     COMPARISON:    05/23/2021     FINDINGS:    Brain:  Focal infarct of the left basal ganglia and appears to  localize to the patient came in. Given low-density appearance this may  represent late acute or early subacute etiology. Infarct is  approximately 12.4 mm.  No intracranial hemorrhage identified.  No  significant white matter disease.    Ventricles:  Unremarkable.  No ventriculomegaly.    Bones/joints:  Unremarkable.  No acute fracture.    Soft tissues:  Unremarkable.    Sinuses:  Unremarkable as visualized.  No acute sinusitis.    Mastoid air cells:  Unremarkable as visualized.  No mastoid effusion.       Impression:      1.  Focal, possibly late acute appearing left basal ganglionic infarct.  2.  No intracranial hemorrhage.     Findings communicated to the ER PASTEVE, at 2:22 PM on 09/08/2022.     This report was finalized on 9/8/2022 2:23 PM by Dr. Surinder Flores MD.             I have personally reviewed the radiology images and read the final radiology report.  ---------------------------------------------------------------------------------------------------------------------  Assessment and Plan:    1.  Late acute/subacute left ganglionic CVA -we will admit patient to telemetry unit and obtain transthoracic echocardiogram with bubble study, MRI brain, CT angiography head and neck and initiate aspirin, Plavix and statin therapy.  We will also obtain hemoglobin A1c and fasting lipid panel in the morning.  Appreciate teleneurology recommendations.    2.  Tobacco abuse -patient reports smoking approximately 1 pack of cigarettes daily.   Patient was offered nicotine patch which she has politely refused.    Disposition likely discharge in the next 24 hours    Roberto Cruz DO  09/08/22  18:24 EDT    Electronically signed by Roberto Cruz DO at 09/08/22 1833          Emergency Department Notes      Aditya Newell PA at 09/08/22 1320                 MEDICAL SCREENING:    Reason for Visit: Right-sided weakness with speech difficulty x4 days.  No change today.  Reviewed with Dr. Ramesh.  No code stroke alerted since he is 4 days out.    Patient initially seen in triage.  The patient was advised further evaluation and diagnostic testing will be needed, some of the treatment and testing will be initiated in the lobby in order to begin the process.  The patient will be returned to the waiting area for the time being and possibly be re-assessed by a subsequent ED provider.  The patient will be brought back to the treatment area in as timely manner as possible.         Aditya Newell PA  09/08/22 1321      Electronically signed by Aditya Newell PA at 09/08/22 1321     Mercy Flores PA at 09/08/22 1435          Subjective   PIT    This is a 61 year old male patient who presents to the ER with chief complaint of weakness. PMH significant for current smoker and chronic pain. He takes aspirin daily. 4 days ago, he started having weakness in the right side of his arm and leg as well as slurred speech. These symptoms have worsened since so he reports to ER for further evaluation. Denies chest pain, SOB.           Review of Systems   Constitutional: Negative for fever.   HENT: Negative.    Respiratory: Negative.    Cardiovascular: Negative.  Negative for chest pain.   Gastrointestinal: Negative.  Negative for abdominal pain.   Endocrine: Negative.    Genitourinary: Negative.  Negative for dysuria.   Skin: Negative.    Neurological: Positive for speech difficulty and weakness. Negative for dizziness, tremors, seizures, syncope, facial  asymmetry, light-headedness, numbness and headaches.   Psychiatric/Behavioral: Negative.    All other systems reviewed and are negative.      No past medical history on file.    No Known Allergies    No past surgical history on file.    No family history on file.    Social History     Socioeconomic History   • Marital status: Single   Tobacco Use   • Smoking status: Current Every Day Smoker     Packs/day: 1.00     Types: Cigarettes   • Smokeless tobacco: Never Used   Vaping Use   • Vaping Use: Never used   Substance and Sexual Activity   • Alcohol use: Yes     Alcohol/week: 3.0 standard drinks     Types: 3 Cans of beer per week     Comment: weekly   • Drug use: Never           Objective   Physical Exam  Vitals and nursing note reviewed.   Constitutional:       General: He is not in acute distress.     Appearance: He is well-developed. He is not diaphoretic.   HENT:      Head: Normocephalic and atraumatic.      Right Ear: External ear normal.      Left Ear: External ear normal.      Nose: Nose normal.   Eyes:      Conjunctiva/sclera: Conjunctivae normal.      Pupils: Pupils are equal, round, and reactive to light.   Neck:      Vascular: No JVD.      Trachea: No tracheal deviation.   Cardiovascular:      Rate and Rhythm: Normal rate and regular rhythm.      Heart sounds: Normal heart sounds. No murmur heard.  Pulmonary:      Effort: Pulmonary effort is normal. No respiratory distress.      Breath sounds: Normal breath sounds. No wheezing.   Abdominal:      General: Bowel sounds are normal.      Palpations: Abdomen is soft.      Tenderness: There is no abdominal tenderness.   Musculoskeletal:         General: No deformity. Normal range of motion.      Cervical back: Normal range of motion and neck supple.   Skin:     General: Skin is warm and dry.      Coloration: Skin is not pale.      Findings: No erythema or rash.   Neurological:      Mental Status: He is alert and oriented to person, place, and time.      Cranial  Nerves: No cranial nerve deficit.      Motor: Weakness present.      Comments: Facial asymmetry and Slurred speech noted. Normal EOMs and pupillary reflexes bilaterally. 3/5 plantar flexion and  strength on the right side. 5/5  and plantar flexion strength on the left side.    Psychiatric:         Behavior: Behavior normal.         Thought Content: Thought content normal.         Procedures          ED Course  ED Course as of 09/08/22 1759   Thu Sep 08, 2022   1437 CT Head Without Contrast  IMPRESSION:  1.  Focal, possibly late acute appearing left basal ganglionic infarct.  2.  No intracranial hemorrhage.     Findings communicated to the ER STEVE BRINK, at 2:22 PM on 09/08/2022.     This report was finalized on 9/8/2022 2:23 PM by Dr. Surinder Flores MD. [MM]   1442 NIH stroke scale is 3 [MM]   1452 Sinus bradycardia with heart rate 58, , QRS 84   Specific ST repolarization abnormalities in V2, V3, V4 [LK]   1728 Spoke with Dr. Barragan, neuro at [facility], who has said to admit the patient to our hospitalist and asked me to do 300 mg plavix today and 75 mg plavix daily after that as well as his daily aspirin 81 mg. She has also asked me to get a CTA of head and neck now and an MRI in the next 24 hours as well as an echo.  [MM]   1737 Spoke with Dr. Cruz who has accepted him in admission.  [MM]      ED Course User Index  [LK] Jessi Borjsa DO  [MM] Mercy Flores PA                                           MDM  Number of Diagnoses or Management Options     Amount and/or Complexity of Data Reviewed  Clinical lab tests: ordered and reviewed  Tests in the radiology section of CPT®: ordered and reviewed  Discuss the patient with other providers: yes        Final diagnoses:   Infarction of left basal ganglia (HCC)       ED Disposition  ED Disposition     ED Disposition   Decision to Admit    Condition   --    Comment   --             No follow-up provider specified.       Medication List      No  changes were made to your prescriptions during this visit.          Mercy Flores PA  09/08/22 1759      Electronically signed by Mercy Flores PA at 09/08/22 1759     Linette England, RN at 09/08/22 1750        Teleneuro assessment by Dr. Barragan completed at this time.    Electronically signed by Linette England, RN at 09/08/22 1753         Facility-Administered Medications as of 9/9/2022   Medication Dose Route Frequency Provider Last Rate Last Admin   • [COMPLETED] aspirin chewable tablet 81 mg  81 mg Oral Once Mercy Flores PA   81 mg at 09/08/22 1807   • aspirin EC tablet 81 mg  81 mg Oral Daily Roberto Cruz DO       • atorvastatin (LIPITOR) tablet 80 mg  80 mg Oral Nightly Roberto Cruz DO   80 mg at 09/08/22 2154   • [COMPLETED] clopidogrel (PLAVIX) tablet 300 mg  300 mg Oral Once Mercy Flores PA   300 mg at 09/08/22 1807   • clopidogrel (PLAVIX) tablet 75 mg  75 mg Oral Daily Roberto Cruz DO       • [COMPLETED] gabapentin (NEURONTIN) capsule 800 mg  800 mg Oral Once Walter Rachel MD   800 mg at 09/08/22 1924   • [COMPLETED] HYDROcodone-acetaminophen (NORCO) 5-325 MG per tablet 1 tablet  1 tablet Oral Once Karthikeyan Ramesh DO   1 tablet at 09/08/22 1920   • [COMPLETED] iopamidol (ISOVUE-370) 76 % injection 100 mL  100 mL Intravenous Once in imaging Karthikeyan Ramesh DO   90 mL at 09/08/22 1910   • sodium chloride 0.9 % flush 10 mL  10 mL Intravenous PRN Mercy Flores PA       • sodium chloride 0.9 % flush 10 mL  10 mL Intravenous Q12H Roberto Cruz DO   10 mL at 09/08/22 2155   • sodium chloride 0.9 % flush 10 mL  10 mL Intravenous PRN Roberto Cruz DO                  Consult Notes (all)      Irina Barragan MD at 09/08/22 1739        Teleneurology Consultation Note    Date of Consultation: 9/8/2022  Requesting Attending: Karthikeyan Ramesh DO  Location: ED or Floor   Date/Time Telestroke doctor on video:  9/8/2022 17:40 EDT  Chief Complaint/Reason for consultation: right sided weakness     History of Present Illness:   Fritz Andujar is a 61 y.o. man with a history of current tobacco use presenting with right sided weakness.     Mr. Andujar presents with his brother in law who helps provide some history.    Mr. Andujar states four days ago he had sudden onset right sided weakness after a fall while helping a friend with some work. He thought the weakness was due to the fall so he stayed at his friends home to get some rest. In the morning and up until today the weakness has persisted. He reports he also experienced some blurred vision and headache since that time.     He is currently on ASA but unsure of the indication. He is a current every day smoker 1 ppd. He denies any history of diabetes or hypertension.        Review of System: All 12 points of review of system has been obtained and pertinent positive mentioned in HPI.   Medical History: Pertinent past medical history, surgical history, family history and social history has been reviewed.   Allergies:   No Known Allergies    Physical Exam:     NIHSS 2 - dysarthria and right arm drift   Mr. Andujar is seen sitting up in bed   His brother-in-law is at bedside   Alert and oriented to exact date and year 2022   Comprehension, naming and repetition are intact  Mild-moderate dysarthria    Extraocular eye movements in tact without nystagmus   No visual field cut. Able to count fingers in both eyes   Face symmetric   Mild right pronator drift   No drift in bilateral lower extremities   Sensation intact to light touch bilaterally  Gait is strolling        Personal review of CNS imaging (Official report by radiology pending)  CT with subacute left basal ganglia infarct     Lab Results:  No results found for: HGBA1C, LDL  No results found for: OJDOWVAE09, TSH    Assessment and Plan:  ##Left basal ganglia ischemic stroke  ##Right sided weakness   #Concern for Acute Ischemic Stroke    Recommend for patients with minor ischemic stroke or high risk TIA, a short course of Dual Antiplatelet Therapy  - [ ] Aspirin 81mg indefinitely  - [ ] Plavix load of 300mg, then 75mg daily for 21 days (end date September 29th, 2022)  - [ ] Atorvastatin 80mg    - [ ] CT angiogram of the head and neck   - [ ] MRI brain with and without contrast   - [ ] Telemetry   - [ ] Echocardiogram with bubble   - [ ] Stroke labs - lipids, hgbA1c  - [ ] 28 day holter monitor at discharge   - [ ] SLP, PT and OT evaluation   - smoking cessation     We will see patient on rounds in AM       Irina Barragan MD  We will continue to follow   If you have any questions about the patient recommendations, please call [phone] at anytime and ask to speak with the neurologist on call.   Press 1 for an emergent consult and 2 for a non-emergent consult.        Teleneurology Patient Verification & Consent  I have proceeded with this evaluation at the direct request of the referring practitioner as there is felt to be no appropriate specialist available at bedside to perform these evaluations. The [facility] practitioner has reported to me this is the correct patient and has obtained verbal consent from the patient/surrogate to perform his voluntary telemedicine evaluation (including obtaining history, performing examination and reviewing data provided by the patient and/or [facility] care provider). I attest that I introduced myself to the patient, provided my credentials, disclosed my location, and determined that, based on review of the patient's chart and discussion with the patient's primary team, telemedicine via a HIPAA-compliant, real-time, face-to-face, two-way, interactive audio and video platform is an appropriate and effective means of providing this service.  The patient/surrogate has the right to refuse this evaluation as they have been explained risks (including potential loss  of confidentiality), benefits, alternatives, and the potential need for subsequent face to face care. Patient/surrogate understands that there is a risk of medical inaccuracies given that our recommendations will be made based on reported data (and we must therefore assume this information is accurate). Knowing that there is a risk that this information is not reported accurately, and that the telemedicine video, audio, or data feed may be incomplete, the patient agrees to proceed with evaluation and holds us harmless knowing these risks. In this evaluation, we will be providing recommendations only. The ultimate decision to follow, or not follow, these recommendations will be left to the bedside treating/requesting practitioner. The patient/surrogate has been notified that other healthcare professionals (including technical personnel) may be involved in this audio-video evaluation. All laws concerning confidentiality and patient access to medical records and copies of medical records apply to telemedicine. The patient/surrogate has received the originating site's Health Notice of Privacy Practices.    Medical Data Reviewed:   1. Data reviewed include clinical labs, radiology, medical test;  2. Obtaining/reviewing old medical records;  3. Obtaining case history from another source;  4. Independent review of CNS images    IIrina MD, saw patient on 9/8/2022 for an initial in-patient or emergency room telemedicine face-to-face consult using interactive technology.             Electronically signed by Irina Barragan MD at 09/08/22 1800

## 2022-09-09 NOTE — THERAPY EVALUATION
Acute Care - Physical Therapy Initial Evaluation   Noé     Patient Name: Fritz Andujar  : 1960  MRN: [id number]  Today's Date: 2022      Visit Dx:     ICD-10-CM ICD-9-CM   1. Infarction of left basal ganglia (HCC)  I63.9 434.91     Patient Active Problem List   Diagnosis   • Non-recurrent unilateral inguinal hernia without obstruction or gangrene     Past Medical History:   Diagnosis Date   • Arthritis    • Asthma    • COPD (chronic obstructive pulmonary disease) (HCC)    • Stroke (HCC)      Past Surgical History:   Procedure Laterality Date   • HERNIA REPAIR       PT Assessment (last 12 hours)     PT Evaluation and Treatment     Row Name 22 1500          Physical Therapy Time and Intention    Subjective Information no complaints  -KM     Document Type evaluation  -KM     Mode of Treatment individual therapy;physical therapy  -KM     Patient Effort good  -KM     Symptoms Noted During/After Treatment none  -KM     Row Name 22 1500          General Information    Patient Profile Reviewed yes  -KM     Patient Observations alert;cooperative;agree to therapy  -KM     Prior Level of Function independent:;all household mobility;ADL's  -KM     Risks Reviewed patient:;LOB;nausea/vomiting;dizziness;increased discomfort  -KM     Benefits Reviewed patient:;improve function;increase independence;increase strength;increase balance  -KM     Row Name 22 1500          Living Environment    Current Living Arrangements home  -KM     People in Home sibling(s)  -KM     Primary Care Provided by self  -KM     Row Name 22 1500          Home Use of Assistive/Adaptive Equipment    Equipment Currently Used at Home cane, straight  -KM     Row Name 22 1500          Cognition    Affect/Mental Status (Cognition) WFL  -KM     Orientation Status (Cognition) oriented x 4  -KM     Follows Commands (Cognition) WFL  -KM     Row Name 22 1500          Range of Motion (ROM)    Range of Motion bilateral  lower extremities;ROM is WFL  -KM     Row Name 09/09/22 1500          Strength (Manual Muscle Testing)    Strength (Manual Muscle Testing) bilateral lower extremities;strength is [facility]  -KM     Row Name 09/09/22 1500          Bed Mobility    Bed Mobility bed mobility (all) activities  -KM     All Activities, [place] (Bed Mobility) modified independence  -KM     Assistive Device (Bed Mobility) bed rails  -KM     Row Name 09/09/22 1500          Transfers    Transfers sit-stand transfer;stand-sit transfer  -KM     Sit-Stand [place] (Transfers) standby assist  -KM     Stand-Sit [place] (Transfers) standby assist  -KM     Row Name 09/09/22 1500          Gait/Stairs (Locomotion)    Gait/Stairs Locomotion gait/ambulation independence;gait/ambulation assistive device;distance ambulated  -KM     [place] Level (Gait) contact guard  -KM     Distance in Feet (Gait) 250  -KM     Pattern (Gait) step-through  -KM     Row Name 09/09/22 1500          Balance    Balance Assessment sitting static balance;standing dynamic balance  -KM     Static Sitting Balance independent  -KM     Position, Sitting Balance sitting edge of bed  -KM     Dynamic Standing Balance contact guard  -KM     Row Name 09/09/22 1500          Plan of Care Review    Plan of Care Reviewed With patient  -     Outcome Evaluation Pt. evaluation completed during PT session. He demonstrates functional mobility skills w/ SBA-CGA. He could ambulate prolonged distances w/ no AD. Pt. does not meet criteria for skilled PT services. Anticipated discharge home.  -KM     Row Name 09/09/22 1500          Therapy Assessment/Plan (PT)    Functional Level at Time of Evaluation (PT) SBA-CGA  -     Criteria for Skilled Interventions Met (PT) no;no problems identified which require skilled intervention  [facility]     Therapy Frequency (PT) evaluation only  -KM     Row Name 09/09/22 1500          Therapy Plan Review/Discharge Plan (PT)    Therapy Plan Review (PT)  evaluation/treatment results reviewed;patient  -KM           User Key  (r) = Recorded By, (t) = Taken By, (c) = Cosigned By    Initials Name Provider Type    Chi Tirado, PT Physical Therapist                  PT Recommendation and Plan  Anticipated Discharge Disposition (PT): home  Therapy Frequency (PT): evaluation only  Plan of Care Reviewed With: patient  Outcome Evaluation: Pt. evaluation completed during PT session. He demonstrates functional mobility skills w/ SBA-CGA. He could ambulate prolonged distances w/ no AD. Pt. does not meet criteria for skilled PT services. Anticipated discharge home.       Time Calculation:    PT Charges     Row Name 09/09/22 1536             Time Calculation    PT Received On 09/09/22  -            User Key  (r) = Recorded By, (t) = Taken By, (c) = Cosigned By    Initials Name Provider Type    Chi Tirado, PT Physical Therapist              Therapy Charges for Today     Code Description Service Date Service Provider Modifiers Qty    36376788701 HC PT EVAL LOW COMPLEXITY 4 9/9/2022 Chi Hi, PT GP 1               Chi Hi PT  9/9/2022

## 2022-09-09 NOTE — PROGRESS NOTES
Malnutrition Severity Assessment      Patient meets criteria for : Severe Malnutrition  Malnutrition Type (last 8 hours)     Malnutrition Severity Assessment     Row Name 09/09/22 1413       Malnutrition Severity Assessment    Malnutrition Type Chronic Disease - Related Malnutrition    Row Name 09/09/22 1413       Insufficient Energy Intake     Insufficient Energy Intake Findings None    Row Name 09/09/22 1413       Muscle Loss    Loss of Muscle Mass Findings Severe    [place] Region Severe - deep hollowing/scooping, lack of muscle to touch, facial bones well defined    Clavicle Bone Region Severe - protruding prominent bone    Acromion Bone Region Severe - squared shoulders, bones, and acromion process protrusion prominent    Scapular Bone Region Severe - prominent bones, depressions easily visible between ribs, scapula, spine, shoulders    Dorsal Hand Region Severe - prominent depression    Patellar Region Severe - prominent bone, square looking, very little muscle definition    Anterior Thigh Region Severe - line/depression along thigh, obviously thin    Posterior Calf Region Severe - thin with very little definition/firmness    Row Name 09/09/22 1413       Fat Loss    Subcutaneous Fat Loss Findings Severe    Orbital Region  Severe - pronounced hollowness/depression, dark circles, loose saggy skin    Upper Arm Region Severe - mostly skin, very little space between folds, fingers touch    Thoracic & Lumbar Region Severe - ribs visible with prominent depressions, iliac crest very prominent    Row Name 09/09/22 1413       Criteria Met (Must meet criteria for severity in at least 2 of these categories: M Wasting, Fat Loss, Fluid, Secondary Signs, Wt. Status, Intake)    Patient meets criteria for  Severe Malnutrition

## 2022-09-09 NOTE — CASE MANAGEMENT/SOCIAL WORK
Discharge Planning Assessment   Noé     Patient Name: Fritz Andujar  MRN: [id number]  Today's Date: 9/9/2022    Admit Date: 9/8/2022       Discharge Plan     Row Name 09/09/22 1611       Plan    Final Discharge Disposition Code 01 - home or self-care    Final Note Pt to be discharged home on this date.            BRYSON IyerW

## 2022-09-09 NOTE — CASE MANAGEMENT/SOCIAL WORK
Discharge Planning Assessment   Noé     Patient Name: Fritz Andujar  MRN: [id number]  Today's Date: 9/9/2022    Admit Date: 9/8/2022     Discharge Needs Assessment     Row Name 09/09/22 1217       Living Environment    People in Home sibling(s)    Name(s) of People in Home Lives at home with Brother and Sister in law    Current Living Arrangements home    Primary Care Provided by self    Provides Primary Care For no one    Family Caregiver if Needed sibling(s)    Quality of Family Relationships helpful;involved;supportive    Able to Return to Prior Arrangements yes       Resource/Environmental Concerns    Resource/Environmental Concerns none    Transportation Concerns none       Transition Planning    Patient/Family Anticipates Transition to home with family    Patient/Family Anticipated Services at Transition     Transportation Anticipated family or friend will provide       Discharge Needs Assessment    Equipment Currently Used at Home cane, straight    Concerns to be Addressed no discharge needs identified    Equipment Needed After Discharge none    Provided Post Acute Provider List? Refused    Provided Post Acute Provider Quality & Resource List? Refused               Discharge Plan     Row Name 09/09/22 1218       Plan    Plan Pt admitted on 9/8/22.  SS received consult per [facility] for discharge planning.  SS spoke with pt at bedside.  Pt resides at home with Brother and Sister in Law at [address] and plans to return home at discharge.  Pt currently does not utilize home health services.  Pt currently utilizes cane via unknown provider.  Pt's PCP is Val Krishna.  Pt utilizes [facility].  Pt stated no needs at this time. SS will follow.              Continued Care and Services - Admitted Since 9/8/2022    Coordination has not been started for this encounter.       Expected Discharge Date and Time     Expected Discharge Date Expected Discharge Time    Sep 11, 2022           Demographic Summary     Row Name 09/09/22 121       General Information    Admission Type inpatient    Referral Source nursing    Preferred Language English                  BRYSON IyerW

## 2022-09-09 NOTE — THERAPY EVALUATION
"Acute Care - Speech Language Pathology Initial Evaluation  [facility]   SPEECH LANGUAGE COGNITIVE EVALUATION     Patient Name: Fritz Andujar  : 1960  MRN: [id number]  Today's Date: 2022             Admit Date: 2022     Fritz Andujar  is seen this am on  3307/1S to participate in a formal s/l and cognitive evaluation to assess safety/function in adls. Pt is positioned seated on bed edge to cooperatively participate. All results and observations of this evaluation are felt to be representative of pt's current functional status.     Mr Andujar presented to [facility] ED w/ a complaint of right sided weakness. Imaging revealed a L basal ganglia infarct. He was initially reported w/ moderate dysarthria.     Social History     Socioeconomic History   • Marital status: Single   Tobacco Use   • Smoking status: Current Every Day Smoker     Packs/day: 1.00     Types: Cigarettes   • Smokeless tobacco: Never Used   Vaping Use   • Vaping Use: Never used   Substance and Sexual Activity   • Alcohol use: Yes     Alcohol/week: 3.0 standard drinks     Types: 3 Cans of beer per week     Comment: weekly   • Drug use: Never        Diet Orders (active) (From admission, onward)     Start     Ordered    22  Diet Regular  Diet Effective Now         22                Mr Andujar is observed on room air w/o complications.     Receptive language skills are intact. Pt is able to id common objects and personal body parts, follow simple and multi-step directives, understand complex \"wh\" questions and participate in conversational exchange w/o difficulties.    Expressive language skills are intact. Pt is able to complete automatic speech tasks, confrontation naming tasks, complete complex oe sentences, respond to complet oe \"wh\" questions, repeat at word/sentence and multiple digit levels, as well as participate in complex conversational exchange. No aphasia, anomia or verbal apraxia evidenced.    Pt is independently oriented to " "person, place and time. Immediate, STM and LTM are wfl w/ pt recalling recent adls and providing personal information w/o delays. Thought organization, processing and problem solving are wfl w/ pt demonstrating appropriate comparing/contrasting, understanding of idiomatic language, convergent and divergent thinking skills.    Facial/oral structures are symmetrical upon observation. Oral mucosa are moist, pink and clean. He is noted w/ limited dentition. Secretions are clear, thin and well controlled. OROM/CHARLEY is wfl w/o lingual deviation upon protrusion. Speech intensity is wfl w/ slightly dysarthric speech noted, however this could also be a result of pt's extremely limited dentition as he reports his speech to sound \"back to normal\". No oral apraxia noted.     Graphic and reading skills are intact, premorbid baseline status. Pt is able to id isolated letters, simple and moderate words, as well as sentences and small paragraphs. Signature is legible. Pt notes he has never been able to write more than his name and a few small words.     Pragmatic skills are wfl w/ appropriate eye gaze patterns and visual tracking. Language is appropriate in context w/ topic initiation and maintenance independently. No left neglect evidenced. Humor response is intact w/ appropriate affect.      Visit Dx:    ICD-10-CM ICD-9-CM   1. Infarction of left basal ganglia (HCC)  I63.9 434.91     Patient Active Problem List   Diagnosis   • Non-recurrent unilateral inguinal hernia without obstruction or gangrene   • Infarction of left basal ganglia (HCC)     Past Medical History:   Diagnosis Date   • Arthritis    • Asthma    • COPD (chronic obstructive pulmonary disease) (HCC)    • Stroke ([facility])      Past Surgical History:   Procedure Laterality Date   • HERNIA REPAIR         Impression:   WFL speech, language and cognitive skills. He is noted w/ slightly dysarthric speech noted, however this could also be a result of pt's extremely limited " "dentition as he reports his speech to sound \"back to normal\".      SLP Recommendation and Plan       Recommendations:   No further formal SLP f/u recommended for s/l and cognitive skills. Pt will participate in further dysphagia evaluation. Please see full dysphagia note for details.    D/w pt results and recommendations w/ verbal understanding and agreement.    D/w RN results and recommendations w/ verbal understanding and agreement.     Thank you for allowing me to participate in the care of your patient-  Aide Murphy M.S., CCC-SLP         EDUCATION  The patient has been educated in the following areas:     Cognitive Impairment Communication Impairment.    Time Calculation:         Therapy Charges for Today     Code Description Service Date Service Provider Modifiers Qty    59906763728 HC ST EVAL SPEECH AND PROD W LANG  4 2022 Aide Murphy, MS CCC-SLP GN 1    14703187170 HC ST EVAL ORAL PHARYNG SWALLOW 4 2022 Aide Murphy, MS CCC-SLP GN 1        Aide Murphy MS CCC-SLP  2022 and       Acute Care - Speech Language Pathology   Swallow Initial Evaluation [facility]   CLINICAL DYSPHAGIA ASSESSMENT     Patient Name: Fritz Andujar  : 1960  MRN: [id number]  Today's Date: 2022             Admit Date: 2022    Fritz Andujar  is seen at bedside this date on 3S-307A to assess safety/efficacy of swallowing fnx, determine safest/least restrictive diet tolerance.     Mr Andujar presented to [facility] ED w/ a complaint of right sided weakness. Imaging revealed a L basal ganglia infarct. He was initially reported w/ moderate dysarthria.     Upon SLP entry he is seated on bed edge having just received his lunch tray immediately prior to SLP entry. He is agreeable to participate in assessment w/ use of current regular consistency diet.     Social History     Socioeconomic History   • Marital status: Single   Tobacco Use   • Smoking status: Current Every Day Smoker     Packs/day: 1.00     Types: Cigarettes   • Smokeless " tobacco: Never Used   Vaping Use   • Vaping Use: Never used   Substance and Sexual Activity   • Alcohol use: Yes     Alcohol/week: 3.0 standard drinks     Types: 3 Cans of beer per week     Comment: weekly   • Drug use: Never        EXAM:    MR Head Without and With Intravenous Contrast     EXAM DATE:    9/9/2022 9:06 AM     CLINICAL HISTORY:    cva; I63.9-Cerebral infarction, unspecified     TECHNIQUE:    Magnetic resonance images of the head/brain without and with  intravenous contrast in multiple planes.     COMPARISON:    CT 09/08/2022     FINDINGS:    Brain:  Acute infarct in the left basal ganglionic region which  appears to occupy the pigtail in, globus pallidus, and portions of the  internal capsule.  No additional acute infarcts identified.  No  intracranial hemorrhage.  Mild chronic small vessel ischemic disease.   No enhancement is noted of the infarct compatible with late acute  etiology.    Ventricles:  Unremarkable.  No ventriculomegaly.    Bones/joints:  Unremarkable.    Sinuses:  Unremarkable as visualized.  No acute sinusitis.    Mastoid air cells:  Unremarkable as visualized.  No mastoid effusion.    Orbits:  Unremarkable as visualized.     IMPRESSION:  1.  As noted on prior CT, findings of acute left basal ganglionic region  infarct again noted with the infarction spanning approximately 2.2 cm.  No hemorrhagic conversion and no enhancement is identified. This would  correspond to late acute etiology.  2.  Minimal chronic small vessel ischemic changes.  3.  Otherwise stable exam.     This report was finalized on 9/9/2022 10:12 AM by Dr. Surinder Flores MD.    Diet Orders (active) (From admission, onward)     Start     Ordered    09/08/22 2015  Diet Regular  Diet Effective Now         09/08/22 2014                He is observed on room air w/o complications.     Mr Andujar is positioned upright and seated on edge of bed to accept multiple po presentations of selections from current lunch tray w/ regular  consistencies present.  Pt is able to self feed and does so across this assessment.     Facial/oral structures are symmetrical upon observation. Lingual protrusion reveals no deviation. Oral mucosa are moist, pink, and clean. Secretions are clear, thin, and well controlled. OROM/CHARLEY is wfl to imitate oral postures. He is noted w/ significantly limited dentition which impacts his intelligibility. Gag is not assessed. Volitional cough is intact w/ adequate intensity, clear in quality, non-productive. Voice is adequate in intensity, clear in quality w/ intelligible speech.    Upon po presentations, adequate bolus anticipation and acceptance w/ good labial seal for bolus clearance via spoon bowl, cup rim stability and suction via straw. Bolus formation, manipulation and control are wfl w/ rotary mastication pattern. A-p transit is timely w/o oral residue.     Pharyngeal swallow is timely w/ adequate hyolaryngeal elevation per palpation. No overt s/s aspiration evidenced across this evaluation. No silent aspiration suspected as pt is w/o changes in vocal quality, respirations or secretions post po presentations. Pt denies odynophagia.      Visit Dx:     ICD-10-CM ICD-9-CM   1. Infarction of left basal ganglia (HCC)  I63.9 434.91     Patient Active Problem List   Diagnosis   • Non-recurrent unilateral inguinal hernia without obstruction or gangrene   • Infarction of left basal ganglia (HCC)     Past Medical History:   Diagnosis Date   • Arthritis    • Asthma    • COPD (chronic obstructive pulmonary disease) (HCC)    • Stroke (HCC)      Past Surgical History:   Procedure Laterality Date   • HERNIA REPAIR         Impression:   Mr Andujar presents w/ wfl oropharyngeal swallow w/o s/s aspiration. No s/s indicative of silent aspiration.  No odynophagia reported.  He is felt to most benefit from a continued least restrictive po diet of regular solids and thin liquids.       SLP Recommendation and Plan       Recommendations:  1.  Regular solids, thin liquids.   2. Meds whole in puree/thins.   3. Upright and centered for all po intake  4. NIDHI precautions.  5. Oral care protocol.    No further formal SLP f/u warranted at this time.    D/w pt results and recommendations w/ verbal agreement.    D/w RN results and recommendations w/ verbal agreement.    Thank you for allowing me to participate in the care of your patient-  Aide Murphy MS CCC-SLP          EDUCATION  The patient has been educated in the following areas:   Dysphagia (Swallowing Impairment).     Time Calculation:       Therapy Charges for Today     Code Description Service Date Service Provider Modifiers Qty    46053254642  ST EVAL SPEECH AND PROD W LANG  4 9/9/2022 Aide Murphy MS CCC-SLP GN 1    [id number] HC ST EVAL ORAL PHARYNG SWALLOW 4 9/9/2022 Aide Murphy MS CCC-SLP GN 1               Aide Murphy MS CCC-SLP  9/9/2022

## 2022-09-09 NOTE — PROGRESS NOTES
Stroke Progress Note       Chief Complaint: Right-sided weakness    Subjective    Subjective     Subjective:  No acute events overnight.  Patient reports he is feeling well, feels that his symptoms have improved.  He currently denies any new complaints, would like to know if his home Neurontin can be resumed.  MRI performed this morning shows the previously noted via CT left basal ganglionic infarct which is likely late acute.  He is alert and orientated, no focal deficits noted, was able to walk around the room some.  TTE performed, results are pending.  Dysarthria is present but seems to have improved some.    Review of Systems   Constitutional: Negative.    Respiratory: Negative.    Cardiovascular: Negative.    Musculoskeletal: Positive for arthralgias and joint swelling.   Skin: Negative.    Neurological: Positive for speech difficulty. Negative for dizziness, tremors, seizures, weakness, numbness and headaches.   Psychiatric/Behavioral: Negative.             Objective    Objective      Temp:  [97.6 °F (36.4 °C)-98.4 °F (36.9 °C)] 97.8 °F (36.6 °C)  Heart Rate:  [] 80  Resp:  [16-20] 18  BP: (115-164)/(72-95) 128/78        Neurological Exam  Mental Status  Awake and alert. Oriented to person, place, time and situation. Recent and remote memory are intact. Mild dysarthria present. Language is fluent with no aphasia. Attention and concentration are normal. Fund of knowledge is appropriate for level of education.    Cranial Nerves  CN II: Visual acuity is normal. Visual fields full to confrontation.  CN III, IV, VI: Extraocular movements intact bilaterally. Normal lids and orbits bilaterally. Pupils equal round and reactive to light bilaterally.  CN V: Facial sensation is normal.  CN VII: Slight right facial droop.  CN IX, X: Palate elevates symmetrically  CN XI: Shoulder shrug strength is normal.  CN XII: Tongue midline without atrophy or fasciculations.    Motor  Normal muscle bulk throughout. Normal muscle  tone. No abnormal involuntary movements. Strength is 5/5 in all four extremities except as noted.  RLE and RUE 4+/5.    Sensory  Light touch is normal in upper and lower extremities.     Coordination  Right: Finger-to-nose normal.Left: Finger-to-nose normal.    Gait  Casual gait is normal including stance, stride, and arm swing.      Physical Exam  Vitals and nursing note reviewed.   Constitutional:       General: He is awake. He is not in acute distress.     Appearance: Normal appearance. He is not ill-appearing.   HENT:      Head: Normocephalic.      Mouth/Throat:      Mouth: Mucous membranes are moist.      Pharynx: Oropharynx is clear.   Eyes:      General: Lids are normal.      Extraocular Movements: Extraocular movements intact.      Pupils: Pupils are equal, round, and reactive to light.   Cardiovascular:      Rate and Rhythm: Normal rate and regular rhythm.   Skin:     General: Skin is warm and dry.   Neurological:      Mental Status: He is alert.      Cranial Nerves: Dysarthria present.      Motor: Weakness present.   Psychiatric:         Mood and Affect: Mood normal.         Behavior: Behavior normal.         Hospital Meds:  Scheduled- aspirin, 81 mg, Oral, Daily  atorvastatin, 80 mg, Oral, Nightly  clopidogrel, 75 mg, Oral, Daily  gabapentin, 800 mg, Oral, Q12H  sodium chloride, 10 mL, Intravenous, Q12H  vitamin B-12, 1,000 mcg, Oral, Daily      Infusions-     PRNs- •  albuterol  •  HYDROcodone-acetaminophen  •  [COMPLETED] Insert peripheral IV **AND** sodium chloride  •  sodium chloride    Results Review:    I reviewed the patient's new clinical results.    Imaging Results (Last 24 Hours)     Procedure Component Value Units Date/Time    MRI Brain With & Without Contrast [[id number]] Collected: 09/09/22 1010     Updated: 09/09/22 1014    Narrative:      EXAM:    MR Head Without and With Intravenous Contrast     EXAM DATE:    9/9/2022 9:06 AM     CLINICAL HISTORY:    cva; I63.9-Cerebral infarction,  unspecified     TECHNIQUE:    Magnetic resonance images of the head/brain without and with  intravenous contrast in multiple planes.     COMPARISON:    CT 09/08/2022     FINDINGS:    Brain:  Acute infarct in the left basal ganglionic region which  appears to occupy the pigtail in, globus pallidus, and portions of the  internal capsule.  No additional acute infarcts identified.  No  intracranial hemorrhage.  Mild chronic small vessel ischemic disease.   No enhancement is noted of the infarct compatible with late acute  etiology.    Ventricles:  Unremarkable.  No ventriculomegaly.    Bones/joints:  Unremarkable.    Sinuses:  Unremarkable as visualized.  No acute sinusitis.    Mastoid air cells:  Unremarkable as visualized.  No mastoid effusion.    Orbits:  Unremarkable as visualized.       Impression:      1.  As noted on prior CT, findings of acute left basal ganglionic region  infarct again noted with the infarction spanning approximately 2.2 cm.  No hemorrhagic conversion and no enhancement is identified. This would  correspond to late acute etiology.  2.  Minimal chronic small vessel ischemic changes.  3.  Otherwise stable exam.     This report was finalized on 9/9/2022 10:12 AM by Dr. Surinder Flores MD.       CT Angiogram Neck [[id number]] Collected: 09/08/22 2003     Updated: 09/08/22 2005    Narrative:      CTA Neck, CTA Head    INDICATION:   Right sided weakness with suspected acute left internal capsule and basal ganglia infarct.    TECHNIQUE:   CT angiogram of the head and neck with IV contrast. 3-D reconstructions were obtained and reviewed.  Evaluation for a significant carotid arterial stenosis is based on the NASCET criteria. Radiation dose reduction techniques included automated exposure  control or exposure modulation based on body size. Count of known CT and cardiac nuc med studies performed in previous 12 months: 0.     COMPARISON:   Noncontrast head CT 9/8/2022    FINDINGS:   CTA neck:  Normal take  off of the arch vessels. The common carotid arteries are patent. The right common carotid artery is larger. Atherosclerotic plaque left proximal ICA with approximately 50% stenosis. Small amount of plaque proximal right ICA but no  hemodynamically significant stenosis. The cervical ICAs appear normal.    The vertebral arteries appear patent and codominant. Head neck soft tissues unremarkable. Severe cystic lung disease and fibrosis within the visualized lung apices. Extensive dental loss.    CTA head:  Cavernous ICA and M1 segments appear patent. Suspected absence of the left A1 segment with prominent right A1 segment and prominent anterior communicating artery with cross-filling of the left anterior circulation from the right. No definite  acute large vessel occlusion. The posterior circulation appears intact. Bilateral posterior communicating arteries. The basilar artery unremarkable. Major venous sinuses and internal cerebral veins appear normal. Normal filling of the cortical branches.  No apparent extension of the patient's suspected left internal capsule and basal ganglia infarct. No evidence of a bleed.      Impression:      50% stenosis left proximal ICA with generalized decreased size of the left carotid system to include the common carotid artery and ICA.    Absent left A1 segment with prominent right A1 segment and anterior communicating artery with cross filling of the left anterior circulation from the right.    No definite large vessel occlusion.    NOTIFICATION: Critical Value/emergent results were called by telephone at the time of interpretation on 9/8/2022 8:02 PM to Shoshana, the patient's nurse who verbally acknowledged these results and will alert the attending physician.    Signer Name: LION Pino MD   Signed: 9/8/2022 8:03 PM   Workstation Name: [facility]    [facility]    CT Angiogram Head [[id number]] Collected: 09/08/22 2003     Updated: 09/08/22 2005     Narrative:      CTA Neck, CTA Head    INDICATION:   Right sided weakness with suspected acute left internal capsule and basal ganglia infarct.    TECHNIQUE:   CT angiogram of the head and neck with IV contrast. 3-D reconstructions were obtained and reviewed.  Evaluation for a significant carotid arterial stenosis is based on the NASCET criteria. Radiation dose reduction techniques included automated exposure  control or exposure modulation based on body size. Count of known CT and cardiac nuc med studies performed in previous 12 months: 0.     COMPARISON:   Noncontrast head CT 9/8/2022    FINDINGS:   CTA neck:  Normal take off of the arch vessels. The common carotid arteries are patent. The right common carotid artery is larger. Atherosclerotic plaque left proximal ICA with approximately 50% stenosis. Small amount of plaque proximal right ICA but no  hemodynamically significant stenosis. The cervical ICAs appear normal.    The vertebral arteries appear patent and codominant. Head neck soft tissues unremarkable. Severe cystic lung disease and fibrosis within the visualized lung apices. Extensive dental loss.    CTA head:  Cavernous ICA and M1 segments appear patent. Suspected absence of the left A1 segment with prominent right A1 segment and prominent anterior communicating artery with cross-filling of the left anterior circulation from the right. No definite  acute large vessel occlusion. The posterior circulation appears intact. Bilateral posterior communicating arteries. The basilar artery unremarkable. Major venous sinuses and internal cerebral veins appear normal. Normal filling of the cortical branches.  No apparent extension of the patient's suspected left internal capsule and basal ganglia infarct. No evidence of a bleed.      Impression:      50% stenosis left proximal ICA with generalized decreased size of the left carotid system to include the common carotid artery and ICA.    Absent left A1 segment with  prominent right A1 segment and anterior communicating artery with cross filling of the left anterior circulation from the right.    No definite large vessel occlusion.    NOTIFICATION: Critical Value/emergent results were called by telephone at the time of interpretation on 9/8/2022 8:02 PM to Shoshana, the patient's nurse who verbally acknowledged these results and will alert the attending physician.    Signer Name: LION Pino MD   Signed: 9/8/2022 8:03 PM   Workstation Name: [facility]    [facility]    CT Head Without Contrast [[id number]] Collected: 09/08/22 1420     Updated: 09/08/22 1425    Narrative:      EXAM:    CT Head Without Intravenous Contrast     EXAM DATE:    9/8/2022 1:21 PM     CLINICAL HISTORY:    right sided weakness     TECHNIQUE:    Axial computed tomography images of the head/brain without intravenous  contrast.  Sagittal and coronal reformatted images were created and  reviewed.  This CT exam was performed using one or more of the following  dose reduction techniques:  automated exposure control, adjustment of  the mA and/or kV according to patient size, and/or use of iterative  reconstruction technique.     COMPARISON:    05/23/2021     FINDINGS:    Brain:  Focal infarct of the left basal ganglia and appears to  localize to the patient came in. Given low-density appearance this may  represent late acute or early subacute etiology. Infarct is  approximately 12.4 mm.  No intracranial hemorrhage identified.  No  significant white matter disease.    Ventricles:  Unremarkable.  No ventriculomegaly.    Bones/joints:  Unremarkable.  No acute fracture.    Soft tissues:  Unremarkable.    Sinuses:  Unremarkable as visualized.  No acute sinusitis.    Mastoid air cells:  Unremarkable as visualized.  No mastoid effusion.       Impression:      1.  Focal, possibly late acute appearing left basal ganglionic infarct.  2.  No intracranial hemorrhage.     Findings communicated to  the ER PA, STEVE Newell, at 2:22 PM on 09/08/2022.     This report was finalized on 9/8/2022 2:23 PM by Dr. Surinder Flores MD.               Lab Results   Component Value Date    HGBA1C 5.30 09/08/2022      Lab Results   Component Value Date    CHOL 181 09/09/2022    TRIG 282 (H) 09/09/2022    HDL 44 09/09/2022    LDL 90 09/09/2022      Lab Results   Component Value Date    WBC 7.71 09/08/2022    HGB 15.3 09/08/2022    HCT 44.9 09/08/2022    MCV 91.4 09/08/2022     09/08/2022      Lab Results   Component Value Date    GLUCOSE 92 09/08/2022    BUN 15 09/08/2022    CREATININE 0.80 09/08/2022    EGFRIFNONA 81 05/23/2021    BCR 18.8 09/08/2022    K 4.3 09/08/2022    CO2 25.1 09/08/2022    CALCIUM 10.3 09/08/2022    ALBUMIN 4.40 09/08/2022    AST 25 09/08/2022    ALT 20 09/08/2022      No results found for: TSH  No results found for: [id number]  No results found for: FOLATE          Assessment/Plan     Assessment/Plan:  Mr. Andujar states four days ago he had sudden onset right sided weakness after a fall while helping a friend with some work. He thought the weakness was due to the fall so he stayed at his friends home to get some rest. In the morning and up until today the weakness has persisted. He reports he also experienced some blurred vision and headache since that time.  He was on aspirin at home however he is unsure of the indication.      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  #Right-sided weakness  #Current everyday smoker  #Mixed hyperlipidemia  #Essential hypertension  #Left ICA stenosis    -Continue atorvastatin 80 mg nightly  -DAPT with Plavix and aspirin for 90 days (end date 9/29/2022) to be followed by aspirin monotherapy  -CTA head neck showed approximately 50% stenosis of left ICA  -MRI shows late acute ischemic stroke left basal ganglionic region  -SLP/PT/OT  -LDL currently 90, goal is less than 70  -A1c is 5.3  -TTE results pending  -28-day Holter monitor at discharge, 14-day Holter already  ordered for discharge  -Discussed with patient the need to quit smoking  -Follow-up in outpatient stroke clinic [place] in 4 to 6 weeks    Discussed plan of care with patient as well as individual stroke risk factors carotid stenosis, hyperlipidemia, hypertension, smoking and prior stroke. We also discussed medications that the patient would be discharged on aspirin 81 mg orally every day, clopidogrel 75 mg orally every day and Lipitor/atorvastatin 80 mg orally every day and the importance of medication compliance in prevention of future strokes. He voices his understanding. Stroke signs and symptoms were reviewed with the patient (F.A.S.T., visual disturbances, dizziness/loss of balance, mental status changes, and severe headache) and he  has been instructed to call 911 if symptoms of TIA/Stroke occur.      The case was discussed with the patient, and will discuss with hospitalist team.  Tele-Neurology will sign off for now, please feel free to call with any questions/concerns.  Thank you again for the consult.    SADIA Farrell  09/09/22  11:46 EDT    This was an audio and video enabled telemedicine encounter.  Dr. Barragan present for encounter via telemedicine.  Verbal consent taken.

## 2022-09-10 NOTE — PAYOR COMM NOTE
"[facility]  GILMA PARKER  PHONE  [phone]  FAX  [phone]  NPI:  [id number]    PATIENT D/C 9/9/2022    Anand Andujar (61 y.o. Male)             Date of Birth   1960    Social Security Number       Address   [address] [place] [id number]    Home Phone   [phone]    MRN   [id number]       Congregational   Non-Zoroastrian    Marital Status   Single                            Admission Date   9/8/22    Admission Type   Emergency    Admitting Provider   Roberto Cruz DO    Attending Provider       Department, Room/Bed   [facility] 3 SOUTH, 3307/1S       Discharge Date   9/9/2022    Discharge Disposition   Home or Self Care    Discharge Destination                               Attending Provider: (none)   Allergies: No Known Allergies    Isolation: None   Infection: COVID (rule out) (09/08/22)   Code Status: Prior   Advance Care Planning Activity    Ht: 172.7 cm (68\")   Wt: 51.6 kg (113 lb 11.2 oz)    Admission Cmt: None   Principal Problem: None                Active Insurance as of 9/8/2022     Primary Coverage     Payor Plan Insurance Group Employer/Plan Group    [employer] [place] [place] [employer] [facility] [place] [place]      Payor Plan Address Payor Plan Phone Number Payor Plan Fax Number Effective Dates    [address]   3/23/2016 - None Entered    [place] [phone]       Subscriber Name Subscriber Birth Date Member ID       ANAND ANDUJAR 1960 [id number]                 Emergency Contacts      (Rel.) Home Phone Work Phone Mobile Phone    KATY SEWELL (Brother) -- -- [phone]              "

## 2022-09-10 NOTE — NURSING NOTE
Transitional Care Note    Enrolled in [facility] Transitional Care Note    Enrolled in [facility] Transitional Care Services under theTCM Model to be followed for 6 weeks post discharge.  BTC will assist with support and education at time of transition home from hospital.  Hospital  will follow throughout the stay at [facility].  Home  will visit within 48 hours of discharge and follow with home vitals and telephone contact for 6 weeks.      Patient admitted to [facility] via Emergency Department.   Admitted for further treatment of CVA.      Patient contacted at bedside.      Explained transition to home program and Patient is agreeable to home visits.  Home  will be Jasmina Martinez LPN

## 2022-09-12 ENCOUNTER — TELEPHONE (OUTPATIENT)
Dept: TELEMETRY | Facility: HOSPITAL | Age: 62
End: 2022-09-12

## 2022-09-12 LAB
QT INTERVAL: 418 MS
QT INTERVAL: 430 MS
QTC INTERVAL: 413 MS
QTC INTERVAL: 422 MS

## 2022-10-24 ENCOUNTER — TELEPHONE (OUTPATIENT)
Dept: NEUROLOGY | Facility: CLINIC | Age: 62
End: 2022-10-24

## 2022-10-24 NOTE — TELEPHONE ENCOUNTER
LVM to call the office to reschedule hospital follow up.     HUB may relay message and schedule first available in [place] office with Surinder Foster.

## 2022-12-14 ENCOUNTER — TRANSCRIBE ORDERS (OUTPATIENT)
Dept: ADMINISTRATIVE | Facility: HOSPITAL | Age: 62
End: 2022-12-14

## 2022-12-14 DIAGNOSIS — M75.101 ROTATOR CUFF SYNDROME OF RIGHT SHOULDER: Primary | ICD-10-CM

## 2023-01-05 ENCOUNTER — HOSPITAL ENCOUNTER (OUTPATIENT)
Dept: MRI IMAGING | Facility: HOSPITAL | Age: 63
Discharge: HOME | End: 2023-01-05
Payer: COMMERCIAL

## 2023-01-05 ENCOUNTER — HOSPITAL ENCOUNTER (OUTPATIENT)
Dept: GENERAL RADIOLOGY | Facility: HOSPITAL | Age: 63
Discharge: HOME | End: 2023-01-05
Payer: COMMERCIAL

## 2023-01-05 DIAGNOSIS — M75.101 ROTATOR CUFF SYNDROME OF RIGHT SHOULDER: ICD-10-CM

## 2023-01-05 LAB — CREAT BLDA-MCNC: 0.8 MG/DL (ref 0.6–1.3)

## 2023-01-05 PROCEDURE — A9577 INJ MULTIHANCE: HCPCS | Performed by: NURSE PRACTITIONER

## 2023-01-05 PROCEDURE — 82565 ASSAY OF CREATININE: CPT

## 2023-01-05 PROCEDURE — 73222 MRI JOINT UPR EXTREM W/DYE: CPT | Performed by: RADIOLOGY

## 2023-01-05 PROCEDURE — 0 GADOBENATE DIMEGLUMINE 529 MG/ML SOLUTION: Performed by: NURSE PRACTITIONER

## 2023-01-05 PROCEDURE — 73222 MRI JOINT UPR EXTREM W/DYE: CPT

## 2023-01-05 RX ADMIN — GADOBENATE DIMEGLUMINE 10 ML: 529 INJECTION, SOLUTION INTRAVENOUS at 09:55

## 2024-02-01 ENCOUNTER — HOSPITAL ENCOUNTER (OUTPATIENT)
Facility: HOSPITAL | Age: 64
Discharge: HOME | End: 2024-02-01
Admitting: NURSE PRACTITIONER
Payer: COMMERCIAL

## 2024-02-01 ENCOUNTER — TRANSCRIBE ORDERS (OUTPATIENT)
Dept: ADMINISTRATIVE | Facility: HOSPITAL | Age: 64
End: 2024-02-01
Payer: COMMERCIAL

## 2024-02-01 DIAGNOSIS — R09.89 ABNORMAL CHEST SOUNDS: Primary | ICD-10-CM

## 2024-02-01 DIAGNOSIS — R09.89 ABNORMAL CHEST SOUNDS: ICD-10-CM

## 2024-02-01 PROCEDURE — 71046 X-RAY EXAM CHEST 2 VIEWS: CPT

## 2024-02-01 PROCEDURE — 71046 X-RAY EXAM CHEST 2 VIEWS: CPT | Performed by: RADIOLOGY
